# Patient Record
Sex: MALE | Race: WHITE | NOT HISPANIC OR LATINO | URBAN - METROPOLITAN AREA
[De-identification: names, ages, dates, MRNs, and addresses within clinical notes are randomized per-mention and may not be internally consistent; named-entity substitution may affect disease eponyms.]

---

## 2024-10-14 ENCOUNTER — OFFICE VISIT (OUTPATIENT)
Dept: URGENT CARE | Facility: CLINIC | Age: 34
End: 2024-10-14

## 2024-10-14 ENCOUNTER — HOSPITAL ENCOUNTER (INPATIENT)
Facility: HOSPITAL | Age: 34
LOS: 2 days | Discharge: HOME | DRG: 433 | End: 2024-10-17
Attending: EMERGENCY MEDICINE | Admitting: STUDENT IN AN ORGANIZED HEALTH CARE EDUCATION/TRAINING PROGRAM

## 2024-10-14 VITALS
WEIGHT: 200 LBS | DIASTOLIC BLOOD PRESSURE: 87 MMHG | SYSTOLIC BLOOD PRESSURE: 139 MMHG | HEART RATE: 131 BPM | BODY MASS INDEX: 27.09 KG/M2 | HEIGHT: 72 IN | RESPIRATION RATE: 19 BRPM | OXYGEN SATURATION: 99 % | TEMPERATURE: 98 F

## 2024-10-14 DIAGNOSIS — R17 JAUNDICE: ICD-10-CM

## 2024-10-14 DIAGNOSIS — R30.0 DYSURIA: Primary | ICD-10-CM

## 2024-10-14 DIAGNOSIS — K70.9 CHRONIC LIVER DISEASE DUE TO ALCOHOL: Primary | ICD-10-CM

## 2024-10-14 DIAGNOSIS — R21 RASH: ICD-10-CM

## 2024-10-14 DIAGNOSIS — R39.11 URINARY HESITANCY: ICD-10-CM

## 2024-10-14 DIAGNOSIS — R10.9 ABDOMINAL PAIN, UNSPECIFIED ABDOMINAL LOCATION: ICD-10-CM

## 2024-10-14 DIAGNOSIS — R00.0 TACHYCARDIA: ICD-10-CM

## 2024-10-14 DIAGNOSIS — R07.9 CHEST PAIN: ICD-10-CM

## 2024-10-14 DIAGNOSIS — R17 HIGH BILIRUBIN: ICD-10-CM

## 2024-10-14 LAB
ALLENS TEST: ABNORMAL
BASOPHILS # BLD AUTO: 0.06 K/UL (ref 0–0.2)
BASOPHILS NFR BLD: 0.5 % (ref 0–1.9)
BILIRUBIN, UA POC OHS: ABNORMAL
BLOOD, UA POC OHS: ABNORMAL
BUN SERPL-MCNC: 6 MG/DL (ref 6–30)
CHLORIDE SERPL-SCNC: 103 MMOL/L (ref 95–110)
CLARITY, UA POC OHS: CLEAR
COLOR, UA POC OHS: ABNORMAL
CREAT SERPL-MCNC: 0.9 MG/DL (ref 0.5–1.4)
DELSYS: ABNORMAL
DIFFERENTIAL METHOD BLD: ABNORMAL
EOSINOPHIL # BLD AUTO: 0 K/UL (ref 0–0.5)
EOSINOPHIL NFR BLD: 0.2 % (ref 0–8)
ERYTHROCYTE [DISTWIDTH] IN BLOOD BY AUTOMATED COUNT: 17.8 % (ref 11.5–14.5)
ETHANOL SERPL-MCNC: <10 MG/DL
FIO2: 21
GLUCOSE SERPL-MCNC: 114 MG/DL (ref 70–110)
GLUCOSE SERPL-MCNC: 92 MG/DL (ref 70–110)
GLUCOSE, UA POC OHS: 100
HCT VFR BLD AUTO: 37.3 % (ref 40–54)
HCT VFR BLD CALC: 41 %PCV (ref 36–54)
HCV AB SERPL QL IA: NORMAL
HGB BLD-MCNC: 13.7 G/DL (ref 14–18)
HIV 1+2 AB+HIV1 P24 AG SERPL QL IA: NORMAL
IMM GRANULOCYTES # BLD AUTO: 0.03 K/UL (ref 0–0.04)
IMM GRANULOCYTES NFR BLD AUTO: 0.3 % (ref 0–0.5)
INR PPP: 1.5 (ref 0.8–1.2)
KETONES, UA POC OHS: 15
LDH SERPL L TO P-CCNC: 2.46 MMOL/L (ref 0.5–2.2)
LEUKOCYTES, UA POC OHS: NEGATIVE
LYMPHOCYTES # BLD AUTO: 1.3 K/UL (ref 1–4.8)
LYMPHOCYTES NFR BLD: 11.4 % (ref 18–48)
MCH RBC QN AUTO: 36.3 PG (ref 27–31)
MCHC RBC AUTO-ENTMCNC: 36.7 G/DL (ref 32–36)
MCV RBC AUTO: 99 FL (ref 82–98)
MODE: ABNORMAL
MONOCYTES # BLD AUTO: 1.4 K/UL (ref 0.3–1)
MONOCYTES NFR BLD: 12.1 % (ref 4–15)
NEUTROPHILS # BLD AUTO: 8.8 K/UL (ref 1.8–7.7)
NEUTROPHILS NFR BLD: 75.5 % (ref 38–73)
NITRITE, UA POC OHS: NEGATIVE
NRBC BLD-RTO: 0 /100 WBC
PH, UA POC OHS: 6.5
PLATELET # BLD AUTO: 211 K/UL (ref 150–450)
PMV BLD AUTO: 11.5 FL (ref 9.2–12.9)
POC IONIZED CALCIUM: 0.89 MMOL/L (ref 1.06–1.42)
POC TCO2 (MEASURED): 24 MMOL/L (ref 23–29)
POTASSIUM BLD-SCNC: 8.1 MMOL/L (ref 3.5–5.1)
PROTEIN, UA POC OHS: 100
PROTHROMBIN TIME: 15.9 SEC (ref 9–12.5)
RBC # BLD AUTO: 3.77 M/UL (ref 4.6–6.2)
SAMPLE: ABNORMAL
SAMPLE: ABNORMAL
SITE: ABNORMAL
SODIUM BLD-SCNC: 132 MMOL/L (ref 136–145)
SPECIFIC GRAVITY, UA POC OHS: 1.02
UROBILINOGEN, UA POC OHS: 1
WBC # BLD AUTO: 11.7 K/UL (ref 3.9–12.7)

## 2024-10-14 PROCEDURE — 87389 HIV-1 AG W/HIV-1&-2 AB AG IA: CPT | Performed by: PHYSICIAN ASSISTANT

## 2024-10-14 PROCEDURE — 83605 ASSAY OF LACTIC ACID: CPT

## 2024-10-14 PROCEDURE — 25500020 PHARM REV CODE 255: Performed by: EMERGENCY MEDICINE

## 2024-10-14 PROCEDURE — 99285 EMERGENCY DEPT VISIT HI MDM: CPT | Mod: 25

## 2024-10-14 PROCEDURE — 86803 HEPATITIS C AB TEST: CPT | Performed by: PHYSICIAN ASSISTANT

## 2024-10-14 PROCEDURE — 81003 URINALYSIS AUTO W/O SCOPE: CPT | Mod: QW,S$GLB,,

## 2024-10-14 PROCEDURE — 96360 HYDRATION IV INFUSION INIT: CPT

## 2024-10-14 PROCEDURE — 80047 BASIC METABLC PNL IONIZED CA: CPT

## 2024-10-14 PROCEDURE — 63600175 PHARM REV CODE 636 W HCPCS: Performed by: EMERGENCY MEDICINE

## 2024-10-14 PROCEDURE — 82248 BILIRUBIN DIRECT: CPT

## 2024-10-14 PROCEDURE — 93010 ELECTROCARDIOGRAM REPORT: CPT | Mod: ,,, | Performed by: INTERNAL MEDICINE

## 2024-10-14 PROCEDURE — 80053 COMPREHEN METABOLIC PANEL: CPT

## 2024-10-14 PROCEDURE — 82962 GLUCOSE BLOOD TEST: CPT | Mod: ,,,

## 2024-10-14 PROCEDURE — 82077 ASSAY SPEC XCP UR&BREATH IA: CPT | Performed by: EMERGENCY MEDICINE

## 2024-10-14 PROCEDURE — 93005 ELECTROCARDIOGRAM TRACING: CPT

## 2024-10-14 PROCEDURE — 84100 ASSAY OF PHOSPHORUS: CPT

## 2024-10-14 PROCEDURE — 99900035 HC TECH TIME PER 15 MIN (STAT)

## 2024-10-14 PROCEDURE — 85025 COMPLETE CBC W/AUTO DIFF WBC: CPT | Performed by: EMERGENCY MEDICINE

## 2024-10-14 PROCEDURE — 85610 PROTHROMBIN TIME: CPT | Performed by: EMERGENCY MEDICINE

## 2024-10-14 PROCEDURE — 99204 OFFICE O/P NEW MOD 45 MIN: CPT | Mod: TIER,S$GLB,,

## 2024-10-14 PROCEDURE — 83735 ASSAY OF MAGNESIUM: CPT

## 2024-10-14 PROCEDURE — 96361 HYDRATE IV INFUSION ADD-ON: CPT

## 2024-10-14 RX ADMIN — IOHEXOL 100 ML: 350 INJECTION, SOLUTION INTRAVENOUS at 11:10

## 2024-10-14 RX ADMIN — SODIUM CHLORIDE, POTASSIUM CHLORIDE, SODIUM LACTATE AND CALCIUM CHLORIDE 1000 ML: 600; 310; 30; 20 INJECTION, SOLUTION INTRAVENOUS at 09:10

## 2024-10-14 NOTE — PROGRESS NOTES
Subjective:      Patient ID: Fermin Moreno is a 34 y.o. male.    Vitals:  height is 6' (1.829 m) and weight is 90.7 kg (200 lb). His oral temperature is 97.8 °F (36.6 °C). His blood pressure is 139/87 and his pulse is 131 (abnormal). His respiration is 19 and oxygen saturation is 99%.     Chief Complaint: Dysuria    This is a 34 y.o. male who presents today with a chief complaint of dark urine, burning with urination, hard to urinate, and abdominal pain x 1 week.     Upon assessment of pt, pt has generalized rash with obvious jaundice noted to skin. Pts has yellowing of bilateral eye whites. Pt denies any past medial history involving liver.         Dysuria   This is a new problem. The current episode started in the past 7 days. The problem occurs every urination. The problem has been gradually worsening. The quality of the pain is described as burning. The pain is at a severity of 0/10. The patient is experiencing no pain. Maximum temperature: unchecked. The fever has been present for 1 - 2 days. Associated symptoms include chills, frequency, hematuria, hesitancy, nausea, sweats, urgency and rash. Treatments tried: Azo and OTC nausea medication. The treatment provided no relief.       Constitution: Positive for chills, fatigue and generalized weakness.   HENT: Negative.     Neck: neck negative.   Cardiovascular: Negative.    Eyes: Negative.    Respiratory: Negative.     Gastrointestinal:  Positive for abdominal pain and nausea.   Endocrine: negative.   Genitourinary:  Positive for dysuria, frequency, urgency, urine decreased and hematuria.   Musculoskeletal: Negative.    Skin:  Positive for color change and rash.   Allergic/Immunologic: Negative.    Neurological: Negative.    Hematologic/Lymphatic: Negative.    Psychiatric/Behavioral: Negative.        Objective:     Physical Exam   Constitutional: He is oriented to person, place, and time. He appears toxic. He appears ill.   HENT:   Head: Normocephalic and  atraumatic.   Nose: Nose normal.   Eyes: Scleral icterus is present.      Comments: Pt has obvious yellowing discoloration noted to bilateral eyes   Neck: Neck supple.   Cardiovascular: Regular rhythm, normal heart sounds and normal pulses. Tachycardia present.   Pulmonary/Chest: Effort normal and breath sounds normal.   Abdominal: Bowel sounds are normal. He exhibits distension. There is abdominal tenderness.   Musculoskeletal: Normal range of motion.         General: Normal range of motion.   Neurological: no focal deficit. He is alert, oriented to person, place, and time and at baseline.   Skin: Skin is rash. jaundice  Psychiatric: His behavior is normal. Mood, judgment and thought content normal.   Nursing note and vitals reviewed.      Assessment:     1. Dysuria    2. Jaundice    3. Rash    4. Abdominal pain, unspecified abdominal location    5. Urinary hesitancy    6. High bilirubin      Results for orders placed or performed in visit on 10/14/24   POCT Urinalysis(Instrument)    Collection Time: 10/14/24  4:20 PM   Result Value Ref Range    Color, POC UA Jackie (A) Yellow, Straw, Colorless    Clarity, POC UA Clear Clear    Glucose, POC  (A) Negative    Bilirubin, POC UA Large (A) Negative    Ketones, POC UA 15 (A) Negative    Spec Grav POC UA 1.025 1.005 - 1.030    Blood, POC UA Trace-intact (A) Negative    pH, POC UA 6.5 5.0 - 8.0    Protein, POC  (A) Negative    Urobilinogen, POC UA 1.0 <=1.0    Nitrite, POC UA Negative Negative    WBC, POC UA Negative Negative   POCT Glucose, Hand-Held Device    Collection Time: 10/14/24  4:43 PM   Result Value Ref Range    POC Glucose 114 (A) 70 - 110 MG/DL      Plan:       Dysuria  -     POCT Urinalysis(Instrument)  -     POCT Glucose, Hand-Held Device    Jaundice    Rash    Abdominal pain, unspecified abdominal location    Urinary hesitancy    High bilirubin          Medical Decision Making:   Initial Assessment:   his is a 34 y.o. male who presents today with  a chief complaint of dark urine, burning with urination, hard to urinate, and abdominal pain x 1 week.       Differential Diagnosis:   Liver disease  Hepatitis  Cirrhosis  Biliary tract disease   Gallstones  Cholecystitis   Cholangitis  Pancreatitis  Urgent Care Management:  Offered to call ambulance for patient, patient declined stating he has family member with him and he would rather have them drive.   Made numerous attempts to call report first attempt stayed on hold for 26 minutes then disconnected, second time stayed on hold 11 minutes then again disconnected, third attempt stayed on hold for 43 minutes.

## 2024-10-14 NOTE — FIRST PROVIDER EVALUATION
Medical screening examination initiated.  I have conducted a focused provider triage encounter, findings are as follows:    Brief history of present illness:  33 y/o hx alcohol overuse presents from  for evaluation of elevated bilirubin and jaundice    Vitals:    10/14/24 1815   BP: (!) 166/101   Pulse: (!) 133   Resp: 20   Temp: 98.6 °F (37 °C)   TempSrc: Oral   SpO2: 100%   Weight: 104.3 kg (230 lb)   Height: 6' (1.829 m)       Pertinent physical exam:  mildly anxious, ambulatory unassisted    Brief workup plan:  labs    Preliminary workup initiated; this workup will be continued and followed by the physician or advanced practice provider that is assigned to the patient when roomed.

## 2024-10-15 PROBLEM — K74.60 DECOMPENSATED CIRRHOSIS: Status: ACTIVE | Noted: 2024-10-15

## 2024-10-15 PROBLEM — K70.9: Status: ACTIVE | Noted: 2024-10-15

## 2024-10-15 PROBLEM — R00.0 TACHYCARDIA: Status: ACTIVE | Noted: 2024-10-15

## 2024-10-15 PROBLEM — D68.9 COAGULOPATHY: Status: ACTIVE | Noted: 2024-10-15

## 2024-10-15 PROBLEM — K70.31 ASCITES DUE TO ALCOHOLIC CIRRHOSIS: Status: ACTIVE | Noted: 2024-10-15

## 2024-10-15 PROBLEM — K72.90 DECOMPENSATED CIRRHOSIS: Status: ACTIVE | Noted: 2024-10-15

## 2024-10-15 LAB
AFP SERPL-MCNC: 4 NG/ML (ref 0–8.4)
ALBUMIN SERPL BCP-MCNC: 2.5 G/DL (ref 3.5–5.2)
ALBUMIN SERPL BCP-MCNC: 2.8 G/DL (ref 3.5–5.2)
ALP SERPL-CCNC: 200 U/L (ref 55–135)
ALP SERPL-CCNC: 240 U/L (ref 55–135)
ALT SERPL W/O P-5'-P-CCNC: 23 U/L (ref 10–44)
ALT SERPL W/O P-5'-P-CCNC: 27 U/L (ref 10–44)
AMMONIA PLAS-SCNC: 104 UMOL/L (ref 10–50)
ANION GAP SERPL CALC-SCNC: 13 MMOL/L (ref 8–16)
ANION GAP SERPL CALC-SCNC: 14 MMOL/L (ref 8–16)
APAP SERPL-MCNC: <3 UG/ML (ref 10–20)
AST SERPL-CCNC: 120 U/L (ref 10–40)
AST SERPL-CCNC: 131 U/L (ref 10–40)
BACTERIA #/AREA URNS AUTO: NORMAL /HPF
BASOPHILS # BLD AUTO: 0.05 K/UL (ref 0–0.2)
BASOPHILS NFR BLD: 0.5 % (ref 0–1.9)
BILIRUB DIRECT SERPL-MCNC: 11.3 MG/DL (ref 0.1–0.3)
BILIRUB SERPL-MCNC: 14.8 MG/DL (ref 0.1–1)
BILIRUB SERPL-MCNC: 15.8 MG/DL (ref 0.1–1)
BILIRUB UR QL STRIP: ABNORMAL
BUN SERPL-MCNC: 6 MG/DL (ref 6–20)
BUN SERPL-MCNC: 6 MG/DL (ref 6–20)
CALCIUM SERPL-MCNC: 8.8 MG/DL (ref 8.7–10.5)
CALCIUM SERPL-MCNC: 9 MG/DL (ref 8.7–10.5)
CERULOPLASMIN SERPL-MCNC: 30 MG/DL (ref 15–45)
CHLORIDE SERPL-SCNC: 102 MMOL/L (ref 95–110)
CHLORIDE SERPL-SCNC: 102 MMOL/L (ref 95–110)
CLARITY UR REFRACT.AUTO: ABNORMAL
CMV DNA SPEC QL NAA+PROBE: NORMAL
CO2 SERPL-SCNC: 22 MMOL/L (ref 23–29)
CO2 SERPL-SCNC: 23 MMOL/L (ref 23–29)
COLOR UR AUTO: ABNORMAL
CREAT SERPL-MCNC: 0.7 MG/DL (ref 0.5–1.4)
CREAT SERPL-MCNC: 0.8 MG/DL (ref 0.5–1.4)
CYTOMEGALOVIRUS PCR, QUANT: NOT DETECTED IU/ML
DIFFERENTIAL METHOD BLD: ABNORMAL
EOSINOPHIL # BLD AUTO: 0.1 K/UL (ref 0–0.5)
EOSINOPHIL NFR BLD: 0.5 % (ref 0–8)
ERYTHROCYTE [DISTWIDTH] IN BLOOD BY AUTOMATED COUNT: 17.4 % (ref 11.5–14.5)
EST. GFR  (NO RACE VARIABLE): >60 ML/MIN/1.73 M^2
EST. GFR  (NO RACE VARIABLE): >60 ML/MIN/1.73 M^2
FOLATE SERPL-MCNC: 3.1 NG/ML (ref 4–24)
GGT SERPL-CCNC: 1846 U/L (ref 8–55)
GLUCOSE SERPL-MCNC: 85 MG/DL (ref 70–110)
GLUCOSE SERPL-MCNC: 95 MG/DL (ref 70–110)
GLUCOSE UR QL STRIP: NEGATIVE
HAV IGM SERPL QL IA: NORMAL
HBV CORE IGM SERPL QL IA: NORMAL
HBV SURFACE AG SERPL QL IA: NORMAL
HCT VFR BLD AUTO: 30.1 % (ref 40–54)
HCV AB SERPL QL IA: NORMAL
HGB BLD-MCNC: 11.1 G/DL (ref 14–18)
HGB UR QL STRIP: ABNORMAL
HYALINE CASTS UR QL AUTO: 0 /LPF
IMM GRANULOCYTES # BLD AUTO: 0.04 K/UL (ref 0–0.04)
IMM GRANULOCYTES NFR BLD AUTO: 0.4 % (ref 0–0.5)
INR PPP: 1.6 (ref 0.8–1.2)
KETONES UR QL STRIP: ABNORMAL
LEUKOCYTE ESTERASE UR QL STRIP: NEGATIVE
LYMPHOCYTES # BLD AUTO: 1.4 K/UL (ref 1–4.8)
LYMPHOCYTES NFR BLD: 13.7 % (ref 18–48)
MAGNESIUM SERPL-MCNC: 1.7 MG/DL (ref 1.6–2.6)
MAGNESIUM SERPL-MCNC: 1.8 MG/DL (ref 1.6–2.6)
MCH RBC QN AUTO: 35.9 PG (ref 27–31)
MCHC RBC AUTO-ENTMCNC: 36.9 G/DL (ref 32–36)
MCV RBC AUTO: 97 FL (ref 82–98)
MICROSCOPIC COMMENT: NORMAL
MONOCYTES # BLD AUTO: 1.3 K/UL (ref 0.3–1)
MONOCYTES NFR BLD: 12.6 % (ref 4–15)
NEUTROPHILS # BLD AUTO: 7.3 K/UL (ref 1.8–7.7)
NEUTROPHILS NFR BLD: 72.3 % (ref 38–73)
NITRITE UR QL STRIP: NEGATIVE
NRBC BLD-RTO: 0 /100 WBC
OHS QRS DURATION: 88 MS
OHS QTC CALCULATION: 473 MS
PH UR STRIP: 6 [PH] (ref 5–8)
PHOSPHATE SERPL-MCNC: 3.3 MG/DL (ref 2.7–4.5)
PHOSPHATE SERPL-MCNC: 3.9 MG/DL (ref 2.7–4.5)
PLATELET # BLD AUTO: 153 K/UL (ref 150–450)
PMV BLD AUTO: 11 FL (ref 9.2–12.9)
POCT GLUCOSE: 104 MG/DL (ref 70–110)
POCT GLUCOSE: 113 MG/DL (ref 70–110)
POTASSIUM SERPL-SCNC: 3.7 MMOL/L (ref 3.5–5.1)
POTASSIUM SERPL-SCNC: 4 MMOL/L (ref 3.5–5.1)
PROT SERPL-MCNC: 7 G/DL (ref 6–8.4)
PROT SERPL-MCNC: 8.1 G/DL (ref 6–8.4)
PROT UR QL STRIP: ABNORMAL
PROTHROMBIN TIME: 16.9 SEC (ref 9–12.5)
RBC # BLD AUTO: 3.09 M/UL (ref 4.6–6.2)
RBC #/AREA URNS AUTO: 4 /HPF (ref 0–4)
SODIUM SERPL-SCNC: 137 MMOL/L (ref 136–145)
SODIUM SERPL-SCNC: 139 MMOL/L (ref 136–145)
SP GR UR STRIP: >1.03 (ref 1–1.03)
SQUAMOUS #/AREA URNS AUTO: 2 /HPF
URN SPEC COLLECT METH UR: ABNORMAL
WBC # BLD AUTO: 10.03 K/UL (ref 3.9–12.7)
WBC #/AREA URNS AUTO: 2 /HPF (ref 0–5)

## 2024-10-15 PROCEDURE — 82103 ALPHA-1-ANTITRYPSIN TOTAL: CPT

## 2024-10-15 PROCEDURE — 82104 ALPHA-1-ANTITRYPSIN PHENO: CPT

## 2024-10-15 PROCEDURE — 20600001 HC STEP DOWN PRIVATE ROOM

## 2024-10-15 PROCEDURE — 80143 DRUG ASSAY ACETAMINOPHEN: CPT

## 2024-10-15 PROCEDURE — 82787 IGG 1 2 3 OR 4 EACH: CPT

## 2024-10-15 PROCEDURE — 82977 ASSAY OF GGT: CPT

## 2024-10-15 PROCEDURE — 82140 ASSAY OF AMMONIA: CPT

## 2024-10-15 PROCEDURE — 25000003 PHARM REV CODE 250: Performed by: STUDENT IN AN ORGANIZED HEALTH CARE EDUCATION/TRAINING PROGRAM

## 2024-10-15 PROCEDURE — 86663 EPSTEIN-BARR ANTIBODY: CPT

## 2024-10-15 PROCEDURE — 80074 ACUTE HEPATITIS PANEL: CPT

## 2024-10-15 PROCEDURE — 99222 1ST HOSP IP/OBS MODERATE 55: CPT | Mod: ,,, | Performed by: PSYCHIATRY & NEUROLOGY

## 2024-10-15 PROCEDURE — 25000003 PHARM REV CODE 250

## 2024-10-15 PROCEDURE — 84100 ASSAY OF PHOSPHORUS: CPT

## 2024-10-15 PROCEDURE — 99223 1ST HOSP IP/OBS HIGH 75: CPT | Mod: ,,, | Performed by: INTERNAL MEDICINE

## 2024-10-15 PROCEDURE — 86038 ANTINUCLEAR ANTIBODIES: CPT

## 2024-10-15 PROCEDURE — 80321 ALCOHOLS BIOMARKERS 1OR 2: CPT | Performed by: STUDENT IN AN ORGANIZED HEALTH CARE EDUCATION/TRAINING PROGRAM

## 2024-10-15 PROCEDURE — 87040 BLOOD CULTURE FOR BACTERIA: CPT | Mod: 59

## 2024-10-15 PROCEDURE — 82105 ALPHA-FETOPROTEIN SERUM: CPT

## 2024-10-15 PROCEDURE — 85610 PROTHROMBIN TIME: CPT

## 2024-10-15 PROCEDURE — 87529 HSV DNA AMP PROBE: CPT | Mod: 59

## 2024-10-15 PROCEDURE — 80053 COMPREHEN METABOLIC PANEL: CPT

## 2024-10-15 PROCEDURE — 83735 ASSAY OF MAGNESIUM: CPT

## 2024-10-15 PROCEDURE — 82746 ASSAY OF FOLIC ACID SERUM: CPT

## 2024-10-15 PROCEDURE — 86381 MITOCHONDRIAL ANTIBODY EACH: CPT

## 2024-10-15 PROCEDURE — 63600175 PHARM REV CODE 636 W HCPCS: Performed by: STUDENT IN AN ORGANIZED HEALTH CARE EDUCATION/TRAINING PROGRAM

## 2024-10-15 PROCEDURE — 85025 COMPLETE CBC W/AUTO DIFF WBC: CPT

## 2024-10-15 PROCEDURE — 86015 ACTIN ANTIBODY EACH: CPT

## 2024-10-15 PROCEDURE — 81001 URINALYSIS AUTO W/SCOPE: CPT | Performed by: EMERGENCY MEDICINE

## 2024-10-15 PROCEDURE — 82390 ASSAY OF CERULOPLASMIN: CPT

## 2024-10-15 PROCEDURE — 94761 N-INVAS EAR/PLS OXIMETRY MLT: CPT

## 2024-10-15 RX ORDER — IBUPROFEN 200 MG
16 TABLET ORAL
Status: DISCONTINUED | OUTPATIENT
Start: 2024-10-15 | End: 2024-10-17 | Stop reason: HOSPADM

## 2024-10-15 RX ORDER — TALC
9 POWDER (GRAM) TOPICAL NIGHTLY PRN
Status: DISCONTINUED | OUTPATIENT
Start: 2024-10-15 | End: 2024-10-17 | Stop reason: HOSPADM

## 2024-10-15 RX ORDER — PROCHLORPERAZINE EDISYLATE 5 MG/ML
5 INJECTION INTRAMUSCULAR; INTRAVENOUS EVERY 6 HOURS PRN
Status: DISCONTINUED | OUTPATIENT
Start: 2024-10-15 | End: 2024-10-17 | Stop reason: HOSPADM

## 2024-10-15 RX ORDER — ACETAMINOPHEN 325 MG/1
650 TABLET ORAL EVERY 6 HOURS PRN
Status: DISCONTINUED | OUTPATIENT
Start: 2024-10-15 | End: 2024-10-17 | Stop reason: HOSPADM

## 2024-10-15 RX ORDER — ALUMINUM HYDROXIDE, MAGNESIUM HYDROXIDE, AND SIMETHICONE 1200; 120; 1200 MG/30ML; MG/30ML; MG/30ML
30 SUSPENSION ORAL 4 TIMES DAILY PRN
Status: DISCONTINUED | OUTPATIENT
Start: 2024-10-15 | End: 2024-10-17 | Stop reason: HOSPADM

## 2024-10-15 RX ORDER — THIAMINE HCL 100 MG
100 TABLET ORAL DAILY
Status: DISCONTINUED | OUTPATIENT
Start: 2024-10-15 | End: 2024-10-17 | Stop reason: HOSPADM

## 2024-10-15 RX ORDER — IBUPROFEN 200 MG
1 TABLET ORAL DAILY PRN
Status: DISCONTINUED | OUTPATIENT
Start: 2024-10-15 | End: 2024-10-17 | Stop reason: HOSPADM

## 2024-10-15 RX ORDER — DIAZEPAM 5 MG/1
10 TABLET ORAL EVERY 6 HOURS PRN
Status: DISCONTINUED | OUTPATIENT
Start: 2024-10-15 | End: 2024-10-17 | Stop reason: HOSPADM

## 2024-10-15 RX ORDER — GLUCAGON 1 MG
1 KIT INJECTION
Status: DISCONTINUED | OUTPATIENT
Start: 2024-10-15 | End: 2024-10-17 | Stop reason: HOSPADM

## 2024-10-15 RX ORDER — NALOXONE HCL 0.4 MG/ML
0.02 VIAL (ML) INJECTION
Status: DISCONTINUED | OUTPATIENT
Start: 2024-10-15 | End: 2024-10-17 | Stop reason: HOSPADM

## 2024-10-15 RX ORDER — LACTULOSE 10 G/15ML
15 SOLUTION ORAL 2 TIMES DAILY
Status: DISCONTINUED | OUTPATIENT
Start: 2024-10-15 | End: 2024-10-16

## 2024-10-15 RX ORDER — IPRATROPIUM BROMIDE AND ALBUTEROL SULFATE 2.5; .5 MG/3ML; MG/3ML
3 SOLUTION RESPIRATORY (INHALATION) EVERY 4 HOURS PRN
Status: DISCONTINUED | OUTPATIENT
Start: 2024-10-15 | End: 2024-10-17 | Stop reason: HOSPADM

## 2024-10-15 RX ORDER — ONDANSETRON 8 MG/1
8 TABLET, ORALLY DISINTEGRATING ORAL EVERY 8 HOURS PRN
Status: DISCONTINUED | OUTPATIENT
Start: 2024-10-15 | End: 2024-10-17 | Stop reason: HOSPADM

## 2024-10-15 RX ORDER — IBUPROFEN 200 MG
24 TABLET ORAL
Status: DISCONTINUED | OUTPATIENT
Start: 2024-10-15 | End: 2024-10-17 | Stop reason: HOSPADM

## 2024-10-15 RX ADMIN — Medication 100 MG: at 08:10

## 2024-10-15 RX ADMIN — LACTULOSE 15 G: 20 SOLUTION ORAL at 11:10

## 2024-10-15 RX ADMIN — CEFTRIAXONE SODIUM 1 G: 1 INJECTION, POWDER, FOR SOLUTION INTRAMUSCULAR; INTRAVENOUS at 12:10

## 2024-10-15 RX ADMIN — ALUMINUM HYDROXIDE, MAGNESIUM HYDROXIDE, AND SIMETHICONE 30 ML: 200; 200; 20 SUSPENSION ORAL at 08:10

## 2024-10-15 RX ADMIN — THERA TABS 1 TABLET: TAB at 08:10

## 2024-10-15 RX ADMIN — LACTULOSE 15 G: 20 SOLUTION ORAL at 08:10

## 2024-10-15 NOTE — CONSULTS
[facility]   DEPARTMENT OF PSYCHIATRY     IDENTIFIERS & DEMOGRAPHICS:     SERVICE: Addiction  ENCOUNTER: initial    -- PATIENT IDENTIFIERS: Fermin Moreno  [id number]  1990  34 y.o.  male  -- ENCOUNTER PROVIDER: William Sistrunk, MD        PRESENTATION:     OVERVIEW OF THE HPI:    35 yo M with past psychiatric history of alcohol use disorder and ADD with new diagnosis of liver cirrhosis 2/2 alcohol abuse who presents for decompensated cirrhosis. Consulted for alcohol abuse education.        SUBJECTIVE/CURRENT FINDINGS:    Chart reviewed and patient seen at the bedside with his spouse. He was unaware the consult to addiction psychiatry was placed but was amenable to conversation. Patient describes consistent drinking for the past 8 years, currently drinking about 12 beers daily. His last drink was 5 days ago ~10/10/2024. He denies any complicated withdrawals or history of seizure. He originally presented to urgent care with c/o dark urine, dysuria, jaundice, chills, sweats, tremors, anxiety, and nausea. He has intermittent bouts of anxiety but does not take any psychiatric medications. Upon discussion regarding treatment options such as inpatient rehab or intensive outpatient programs, patient expressed neutral opinion. He expressed feeling overwhelmed and not being in a place to make a decision. He was counseled on medication assisted treatment options which would be best managed by an outpatient psychiatrist. He does not currently have an outpatient psychiatrist but is open to seeing one. Patient was educated on complete abstinence of alcohol moving forwards due to his liver cirrhosis and to prevent the need for a transplant in the future. Patient expressed understanding and was in agreement. Overall patient was processing the information and new diagnosis but was accepting and thankful for the resources provided. He was advised to ask his primary team if any questions arose regarding addiction psychiatry  resources.     Per Chart Review:    Per Primary Team:   Fermin Moreno is a 34 male PMH of Psoriasis, alcohol abuse being admitted to  for hyperbilirubinemia and jaundice. Patient initially presented to ED with CC dark urine and mild dysuria x 1.5 weeks. Associated chills, sweats, tremors, anxiety, nausea, and yellowing of this skin/ eyes. Patient endorses about 8 years of heavy drinking. First, 3-4 years drinking ~1 pint vodka daily followed by 4 years of drinking ~12 beers daily. Last drink was 5 days ago. Denies documented fevers, confusion, hallucinations, LOC, seizures, lightheadedness, lethargy, abdominal swelling, leg swelling, SOB, chest pain, bowel changes, vomiting, flank pain, bleeding. Endorses mild intermittent LLQ pain, resolved at time of admission. Patient denies known hx of liver disease, heart disease, kidney disease. He does not follow with a PCP and has never seen a hepatologist.       REVIEW OF SYSTEMS:     N   Sleep Disturbance/Disruption   N   Appetite/Weight Change   N   Alterations in Energy Level   N   Impaired Focus/Concentration   Y   Depressive Symptomatology  +depressed mood     Y   Excessive Anxiety/Worry  +generalized anxiety/worry     N   Dysregulated Mood/Behavior   N   Manic Symptomatology   N   Psychosis   N   Trauma-Related   N   Impulsivity/Compulsivity/Obsessionality    Regarding the current presentation, no other significant issues or complaints are voiced or known at this time.       ADD-ON PSYCHOTHERAPY:     ADD-ON THERAPY     HISTORY:       PSYCH  SUBSTANCE  FAMILY  SOCIAL  MEDICAL     Y   Previous/Pre-Existing Psychiatric Diagnoses  anxiety, depression   N   Past Psychotropic Trials   N   Current Psychiatric Provider   N   Hx of Outpatient Psychiatric Treatment   N   Hx of Psychiatric Hospitalization   N   Hx of Suicidal Ideation/Threats   N   Hx of Suicide Attempts/Gestures   N   Hx of  Homicidal Ideation/Threats   N   Hx of Homicidal Behavior   N   Hx of Non-Suicidal Self-Injurious Behavior   N   Hx of Perpetrated Violence   N   Documented Hx of Malingering   N   Hx of Psychosis   N   Hx of Bipolar Diathesis   Y   Hx of Depression   Y   Hx of Anxiety   N   Hx of Insomnia   N   Hx of Delirium     N   Hx of Formal HENRRY Treatment   Y   Recent Alcohol Consumption  +SASQ   +   Drinking Pattern (frequency)  +daily   +   Drinking Pattern (amount)  +heavy drinking   N   Hx of Nicotine Use   Y   Hx of Alcohol Misuse/Abuse   N   Hx of Illicit Drug Misuse/Abuse   N   Hx of Prescription Drug Misuse/Abuse     U   Family Psychiatric History     U   Developmental Delay/Disability   Y   GED/High School Diploma   U   Post-Secondary Education   U   Currently Employed   U   Currently on Disability   U   Financially Stable   Y   Functions Independently   Y   Domiciled   Y   Intact Support System   N   Heterosexual/Cisgender   Y   Currently in a Relationship   Y   Ever    U   Ever /   U   Children/Dependents   U   Yazdanism/Spiritual   U   Hobbies/Recreational Activities   U   Hx of  Service     U   Ever Charged/Convicted   U   Current Probation/[place]/Diversion   U   Hx of Incarceration     N   Hx of Seizures   N   Hx of Head Trauma     Y   Medical Hx & Diagnoses  decompasated liver cirrosis due to Alcohol use.   N   Allergies    >> SCHEDULED AND PRN MEDS: reviewed/reconciled  see MEDCARD      Allergies:  Patient has no known allergies.     EXAMINATION:     VITALS:  /82   Pulse (!) 111   Temp 99.1 °F (37.3 °C) (Oral)   Resp 18   Ht 6' (1.829 m)   Wt 97.8 kg (215 lb 9.8 oz)   SpO2 97%   BMI 29.24 kg/m²     MENTAL STATUS EXAMINATION:  Appearance: appears stated age, normal weight  appropriately dressed,  adequately groomed, in no apparent distress, well-appearing    slightly anxious  Behavior & Attitude: participative, under good behavioral control, able to redirect, appropriate eye contact  calm, engaged, agreeable, cooperative    Movements & Motor Activity: no psychomotor agitation, no psychomotor retardation, normal gait, normal station, ambulates without assistance, not wheelchair bound (able to ambulate), no weakness, no spasticity, no rigidity, no tics, no tremor, no akathisia, no dyskinesia, no ataxia, no parkinsonism    Speech & Language: normal rate, normal volume, normal quantity, normal latency, spontaneous, reciprocal, fluent    Mood: fine/good  Affect: euthymic, reactive, full range  appropriate given the situation/context, mood congruent    Thought Process & Associations: linear, goal-directed, organized, logical, coherent, relevant, abstract  no loosening of associations    Thought Content & Perceptions: no delusions, no paranoid ideation, no ideas of reference, no grandiosity, no hyperreligiosity, no hallucinations, no responding to internal stimuli    Sensorium: awake, alert, clear  no confusion, no delirium    Orientation: grossly intact, oriented to person, oriented to place, oriented to time, oriented to situation    Recent & Remote Memory: intact (recent), intact (remote)    Attention & Concentration: intact  attentive to conversation, not easily distracted    Fund of Knowledge: intact, vocabulary proficient    Insight: intact, good  demonstrates sufficient awareness of condition/situation    Judgment: intact, good  heeds instructions/advice            RISK & REGULATORY:      RISK PARAMETERS (current to the encounter/episode  NOT inclusive of past history):     N   Suicidal Ideation/Threats   N   Suicide Attempts/Gestures   N   Homicidal Ideation/Threats   N   Homicidal Behavior   N   Non-Suicidal Self-Injurious Behavior   N   Perpetrated  Violence     FIREARMS & WEAPONS:     N   Ready Access to Firearms   Y   Gun Safety Counseled  e.g., proper storage, inherent risk     SAFETY SCREENINGS:    -- FUTURE ORIENTED: YES  -- REMORSE/REGRET: YES    -- RISK MITIGATION & PREVENTION:      - INTERVENTIONS: safety plan, advice/counseling, harm reduction     REGULATORY:      INFORMED CONSENT & SHARED DECISION MAKING are the hallmark and bedrock of good clinical care, and as such have been employed and obtained, respectively, to the degree possible.  Discussed, to the extent possible, diagnosis, risks and benefits of proposed treatment (e.g., medication, therapy) vs alternative treatments vs no treatment, potential side effects of these treatments, and the inherent unpredictability of treatment.        WARNINGS & PRECAUTIONS:  >> In cases of emergencies (e.g. SI/HI resulting in danger to self or others, functioning deteriorating to the level of grave disability), call 911 or [phone], or present to the emergency department for immediate assistance.    >> Individuals should not operate a motor vehicle or heavy machinery if effects of medications or underlying symptoms/condition impair the ability to do so safely.    >> FULLY comply with ANY/ALL medication as prescribed/instructed and report ANY/ALL suspected adverse effects to appropriate health care providers.       ASSESSMENT & PLAN:     DIAGNOSES & PROBLEMS:       1.  Alcohol use disorder, severe, in detox period    PSYCHOTROPIC REGIMEN:   (C)=Continue as prescribed  (A)=Adjust as noted  (I)=Iniitate  (D)=Discontinue      1.  Agree with withdrawal mangamant. Continue PRN valium (C)    Alcohol use disorder, severe     >> DEFER management of NON-PSYCHIATRIC medication(s) to the prescribing primary and/or specialist provider(s)    -- PLAN (goals  recommentations):          >> DEFER management of NON-PSYCHIATRIC medication(s) to the prescribing primary and/or specialist provider(s)   >> attended to therapeutic  alliance, via the building and maintenance of rapport and trust  >> psychotherapy was provided during the session, and augmentation with additional psychotherapy is recommended or planned  >> follow with primary care provider for routine health maintenance and management of medical co-morbidities, as well as any indicated/needed specialists  >> advised to abstain from alcohol and illicit drug use  >> successfully complete a licensed accredited addiction rehab program and follow all aftercare recommendations  >> continue alcohol (or sedative-hypnotic) withdrawal protocol  >> will sign off at this time, thank you    ADDICTION COUNSELING & MANAGEMENT    >> counseled on full abstinence from alcohol and substances of abuse (illicit and prescription), as well as maintenance of a recovery lifestyle  >> harm reduction techniques discussed, as warranted, to mitigate risk from problematic behaviors  >> serial laboratory testing (e.g. PETH, serum ethanol, urine toxicology) recommended and/or planned to provide accountability, as well as guide and refine treatment moving forward  >> importance of lifelong, active engagement in 12 step (or equivalent) mutual self-help program(s) was stressed/reinforced, including regular meeting attendance and acquisition/maintenance of a sponsor  >> education and/or resources discussed and/or provided for various addiction recovery and rehabilitation options  >> motivational interviewing applied, relapse prevention provided    CHART REVIEW: available documentation has been reviewed, and pertinent elements of the chart have been incorporated into this evaluation where appropriate.       DIAGNOSTIC TESTING:      Glu 95  10/15/2024  Li *   *  TSH *   *    HgA1c *   *  VPA *   *   FT4 *   *    Na 137  10/15/2024  CLZ *   *  WBC 10.03  10/15/2024    Cr 0.7  10/15/2024  ANC 7.3; 72.3;   10/15/2024   Hgb 11.1 (L)  10/15/2024     BUN 6  10/15/2024  Trop I *   *  HCT 30.1 (L)  10/15/2024      GFR >60.0  10/15/2024   CPK *   *    10/15/2024     Alb 2.5 (L)  10/15/2024   PRL *   *  B12 *   *     T Bili 14.8 (H)  10/15/2024  Chol *   *  B9 3.1 (L)  10/15/2024     (H)  10/15/2024  TGs *   *  B1 *   *     (H)  10/15/2024  HDL *   *  Vit D *   *     ALT 23  10/15/2024  LDL *   *  HIV Non-reactive  10/14/2024     INR 1.6 (H)  10/15/2024  Lois *   *   Hep C Non-reactive  10/15/2024    GGT 1,846 (H)  10/15/2024  Lip *   *  RPR *   *    MCV 97  10/15/2024   NH4 104 (H)  10/15/2024  UPT *   *      PETH *   *  THC *   *    ETOH <10  10/14/2024  MYLES *   *    EtG *   *  AMP *   *    ALC *   *  OPI *   *    BZO *   *  MTD *   *     BAR *   *  BUP *   *    PCP *   *  FEN *   *     Results for orders placed or performed during the hospital encounter of 10/14/24   EKG 12-lead    Collection Time: 10/14/24  6:20 PM   Result Value Ref Range    QRS Duration 88 ms    [facility] QTC Calculation 473 ms    Narrative    Test Reason : R00.0,    Vent. Rate : 127 BPM     Atrial Rate : 127 BPM     P-R Int : 128 ms          QRS Dur : 088 ms      QT Int : 326 ms       P-R-T Axes : 041 -09 032 degrees     QTc Int : 473 ms    Sinus tachycardia  Minimal voltage criteria for LVH, may be normal variant ( R in aVL )  Borderline Abnormal ECG  No previous ECGs available  Confirmed by Ambrocio Montalvo MD ([id number]) on 10/15/2024 8:39:56 AM    Referred By: [email or web address]   SELF           Confirmed By:Ambrocio Montalvo MD        FRASER & LINKS:        Y  = yes/endorses     N  = no/denies     U  = unknown/unable to assess    ADHD   AIMS   AUDIT   AUDIT-C   C-SSRS (Screen)   C-SSRS (Short)   C-SSRS (Full)   DAST   DAST-10   LIA-7   MoCA   PCL-5   PHQ-9   HENRRY   YMRS     Inpatient consult to Psychiatry  Consult performed by: Sistrunk, William, MD  Consult ordered by: Alize Doyle PA-C        [facility] [employer]*

## 2024-10-15 NOTE — CONSULTS
[facility]  Hepatology  Consult Note    Patient Name: Fermin Moreno  MRN: [id number]  Admission Date: 10/14/2024  Hospital Length of Stay: 0 days  Code Status: Full Code   Attending Provider: Ena Stewart MD   Consulting Provider: Varun Mathias MD  Primary Care Physician: Beverly Jimenez III, MD  Principal Problem:Decompensated cirrhosis    Inpatient consult to Hepatology  Consult performed by: Varun Mathias MD  Consult ordered by: Alize Doyle PA-C      Subjective:     HPI: Fermin Moreno is a 34 y.o. male with history of alcohol use disorder, psoriasis, and ADHD who is admitted for decompensated alcohol-related cirrhosis. Patient initially presented on 10/14 to Urgent Care due to 2 weeks of dark urine and difficulty with urination. He was found to be jaundiced with scleral icterus and was directed to present to [facility] ED. In the ED, labs were notable for Hgb 13.7, PT 15.9, INR 1.5, , T bili 15.8 (direct bili 11.3),  and ALT 27. Albumin 2.8. CT showed changes consistent with chronic liver disease with diffuse heterogeneous attenuation of the liver parenchyma suggestive of underlying liver dysfunction. Associated stigmata of portal hypertension including splenomegaly, upper abdominal and gastroesophageal varices, and small volume ascites. Hepatology consulted for decompensated cirrhosis related to alcohol use.    Alcohol history: Started drinking heavily about 7 years ago. The first 3 years he reports drinking primarily hard liquor and about 1/2 pint of vodka regularly. The last four years he reports drinking up to 12 beers daily, but more commonly 6-8 beers; not daily but fairly regularly. Prior to 7 years ago, was not drinking regularly.    No history of autoimmune disease, prior liver disease, no IVDU or recent tattoos (two tattoos in distant past) and reports prior DUI when he was < 21 years old that was expunged.      Past Medical History:   Diagnosis Date    ADD (attention deficit  disorder)     Psoriasis        History reviewed. No pertinent surgical history.    No family history on file.    Social History     Socioeconomic History    Marital status: Single   Tobacco Use    Smoking status: Every Day     Types: Vaping with nicotine     Passive exposure: Current    Smokeless tobacco: Never   Substance and Sexual Activity    Alcohol use: Yes     Comment: Occ    Drug use: Never     Social Drivers of Health     Financial Resource Strain: Low Risk  (10/15/2024)    Overall Financial Resource Strain (CARDIA)     Difficulty of Paying Living Expenses: Not very hard   Food Insecurity: No Food Insecurity (10/15/2024)    Hunger Vital Sign     Worried About Running Out of Food in the Last Year: Never true     Ran Out of Food in the Last Year: Never true   Transportation Needs: No Transportation Needs (10/15/2024)    TRANSPORTATION NEEDS     Transportation : No   Stress: No Stress Concern Present (10/15/2024)    [employer] [place] [employer] - Occupational Stress Questionnaire     Feeling of Stress : Not at all   Housing Stability: Low Risk  (10/15/2024)    Housing Stability Vital Sign     Unable to Pay for Housing in the Last Year: No     Homeless in the Last Year: No       No current facility-administered medications on file prior to encounter.     Current Outpatient Medications on File Prior to Encounter   Medication Sig Dispense Refill    dextroamphetamine-amphetamine (AMPHETAMINE SALT COMBO) 20 mg tablet Take 1 tablet (20 mg total) by mouth 2 (two) times daily as needed. (Patient not taking: Reported on 10/14/2024) 60 tablet 0       Review of patient's allergies indicates:  No Known Allergies    Review of Systems   Constitutional:  Negative for chills and fever.   HENT:  Negative for congestion and sore throat.    Eyes:  Negative for double vision and photophobia.   Respiratory:  Negative for cough and shortness of breath.    Cardiovascular:  Negative for chest pain and palpitations.    Gastrointestinal:  Negative for abdominal pain and vomiting.   Genitourinary:  Negative for dysuria and urgency.   Musculoskeletal:  Negative for myalgias and neck pain.   Skin:  Positive for rash.   Neurological:  Positive for tremors. Negative for dizziness and weakness.   Psychiatric/Behavioral:  Negative for depression. The patient is not nervous/anxious.         Objective:     Vitals:    10/15/24 0354   BP: (!) 155/75   Pulse: 98   Resp: 17   Temp: 98.9 °F (37.2 °C)         Constitutional: No acute distress, AAOx3. Mild tremors.  HENT: Normal, atraumatic  Eyes: Scleral icterus. Pupils equal and reactive.  Cardiovascular: Normal rate, regular rhythm, no murmurs.  Respiratory: No distress, stable on room air  GI: Soft, non-distended, non-tender to palpation  Musculoskeletal: ROM intact, no muscle wasting  Skin: Jaundiced. Multiple diffuse psoriatic lesions on arms and legs.  Neurological: No focal deficits. Mentation intact  Psychiatric: Mood normal. Affect normal.    Significant Labs:  Recent Labs   Lab 10/14/24  1840   HGB 13.7*       Lab Results   Component Value Date    WBC 11.70 10/14/2024    HGB 13.7 (L) 10/14/2024    HCT 41 10/14/2024    MCV 99 (H) 10/14/2024     10/14/2024       Lab Results   Component Value Date     10/14/2024    K 3.7 10/14/2024     10/14/2024    CO2 23 10/14/2024    BUN 6 10/14/2024    CREATININE 0.8 10/14/2024    CALCIUM 9.0 10/14/2024    ANIONGAP 14 10/14/2024       Lab Results   Component Value Date    ALT 27 10/14/2024     (H) 10/14/2024    ALKPHOS 240 (H) 10/14/2024    BILITOT 15.8 (H) 10/14/2024       Lab Results   Component Value Date    INR 1.5 (H) 10/14/2024       Significant Imaging:  Reviewed pertinent radiology findings.       Assessment/Plan:     34M with history of EtOH use disorder who presents with decompensated alcohol-related cirrhosis with signs of jaundice and scleral icterus. His labs with elevated AST to 131 , and T-bili 15.8.  INR 1.5. He has never had these issues in the past, and this is a new diagnosis for the patient. He reports a longstanding history of heavy and regular alcohol consumption for the past 7 years or so. No history of autoimmune disease, prior liver disease, no IVDU or recent tattoos (two tattoos in distant past) and reports prior DUI when he was < 21 years old that was expunged.  Imaging shows hepatomegaly of 25cm with heterogeneous echotexture and nodular contour. No focal lesions seen. Portal hypertensive changes were seen as well on ultrasound. His presentation is consistent with decompensated alcohol-related cirrhosis possibly precipitated by or in the setting of acute alcoholic hepatitis.      Problem List:  Decompensated alcohol-related cirrhosis  Elevated LFTs, suspected alcoholic hepatitis  Coagulopathy, INR 1.5  Portal hypertension  Alcohol use disorder  Elevated BMI of 29      Recommendations:  - Please start Ceftriaxone 1g q24h for prophylaxis.  - Daily PT/INR, CMP and CBC while admitted  - PETH ordered  - If able to, would obtain diagnostic paracentesis with fluids studies.  - Serologies ordered: alpha-1 antitrypsin, ceruloplasmin, RODRIGO, anti-mitochondrial antibodies, anti-smooth muscle antibodies, hepatitis panel, HSV, CMV, EBV, IgG levels, AFP  - Consider Nutrition consult for optimization of diet, and continue thiamine supplementation.  - Will arrange for close follow-up with Hepatology on discharge. Patient was counseled on alcohol cessation and it was strongly recommended that he stop drinking.  - Consider Dermatology consult for his diffuse, untreated psoriasis.      Thank you for involving us in the care of Fermin Moreno. Please call with any additional questions, concerns or changes in the patient's clinical status. We will continue to follow.      Varun Mathias MD  Internal Medicine PGY3  Hepatology

## 2024-10-15 NOTE — ASSESSMENT & PLAN NOTE
Tachycardia   Alcoholic liver cirrhosis    Patient with known Cirrhosis with Child's class 9. Co-morbidities are present and inclusive of ascites, portal hypertension, esophageal varices, anemia/pancytopenia, and anticoagulation.  MELD-Na score calculated; MELD 3.0: 24 at 10/14/2024 11:37 PM  MELD-Na: 21 at 10/14/2024 11:37 PM  Calculated from:  Serum Creatinine: 0.8 mg/dL (Using min of 1 mg/dL) at 10/14/2024 11:37 PM  Serum Sodium: 139 mmol/L (Using max of 137 mmol/L) at 10/14/2024 11:37 PM  Total Bilirubin: 15.8 mg/dL at 10/14/2024 11:37 PM  Serum Albumin: 2.8 g/dL at 10/14/2024 11:37 PM  INR(ratio): 1.5 at 10/14/2024  6:40 PM  Age at listing (hypothetical): 34 years  Sex: Male at 10/14/2024 11:37 PM      Continue chronic meds. Etiology likely ETOH. Will avoid any hepatotoxic meds, and monitor CBC/CMP/INR for synthetic function.     Patient presents with hyperbilirubinemia, bilirubin in urine, and jaundice.   8 years heavy alcohol abuse   - tachy to 133 on arrival, improved with 1L LR; hold off on further IVF for now given ascites   - CT abd pelvis with evidence of cirrhosis, mild ascites, portal HTN, varices   - PT 15.9/ INR 1.5   - Alcohol <10 (last drink 5 days ago)  - check U tox, acetaminophen level, ammonia, lipid panel   - neg hep c and HIV   - check liver doppler US   - hepatology consult   - limit tylenol, NSAIDS   - start thiamine, MV, folate   - CIWA monitoring; prn valium for CIWA >8  - addiction psych consulted for education/ assistance

## 2024-10-15 NOTE — CONSULTS
[facility] - Telemetry [facility] Medicine  Consult Note    Patient Name: Fermin Moreno  MRN: [id number]  Admission Date: 10/14/2024  Hospital Length of Stay: 0 days  Attending Physician: Ena Stewart MD   Primary Care Provider: Beverly Jimenez III, MD       Inpatient consult to [facility] Medicine-General  Consult performed by: Sabina Mckeon MD  Consult ordered by: Ena Stewart MD            Received this consult for diagnostic and potentially therapeutic paracentesis. Ultrasound performed at the bedside, no ascites noted in the anterior abdomen nor in the bilateral paracolic gutters. 1cm x 1cm pocket of anechoic ascites noted in the hepatorenal recess, not amenable to paracentesis.     Dr. Stewart updated.     Thank you for your consult. I will sign off. Please contact us if you have any additional questions.    Sabina Mckeon MD, MPH, FACP  Senior Physician, Department of Hospital Medicine  [facility]  [address], [place], [place]

## 2024-10-15 NOTE — PLAN OF CARE
SW attempted to complete MAITE with patient//patient sleeping. SW will try to re-attempt at a later time.     COLIN Batista  [facility]   Ext: [phone]

## 2024-10-15 NOTE — ED PROVIDER NOTES
Encounter Date: 10/14/2024       History     Chief Complaint   Patient presents with    Abnormal Lab     Liver enzymes elevated, last etoh about week ago, took 3 bactrim thought had uti urine was dark     34 male PMH of Psoriasis, alcohol abuse presents to ED w/ complaint of dysuria x2 weeks. He started to have urinary urgency, frequency, and dark discoloration over the last week and a half along with some subjective fevers, chills, nausea, and scleral icterus. He also quit drinking approx 7 days ago and before that was drinking approx 12 beers daily. He denied current sx of withdrawal including tremors, sweating, seizures. He also denied other sx such as cough, SOB, chest pain, or abdominal pain. He believed his sx were due to UTI, but on presentation to urgent care he was referred to ED because his UA showed high levels of ketones.     The history is provided by the patient. No  was used.     Review of patient's allergies indicates:  No Known Allergies  Past Medical History:   Diagnosis Date    ADD (attention deficit disorder)     Psoriasis      History reviewed. No pertinent surgical history.  No family history on file.  Social History     Tobacco Use    Smoking status: Every Day     Types: Vaping with nicotine     Passive exposure: Current    Smokeless tobacco: Never   Substance Use Topics    Alcohol use: Yes     Comment: Occ    Drug use: Never     Review of Systems    Physical Exam     Initial Vitals [10/14/24 1815]   BP Pulse Resp Temp SpO2   (!) 166/101 (!) 133 20 98.6 °F (37 °C) 100 %      MAP       --         Physical Exam    HENT:   Head: Normocephalic and atraumatic.   Kernicterus present   Eyes: Scleral icterus is present.   Kernicterus present   Cardiovascular:  Intact distal pulses.           -tachycardic, normal rhythm   Pulmonary/Chest: Breath sounds normal. No respiratory distress.   Abdominal: Bowel sounds are normal.   -palpable liver edge  -no tenderness to palpation  throughout the abdomen     Neurological: He is alert and oriented to person, place, and time.   Skin: Capillary refill takes 2 to 3 seconds.         ED Course   Procedures  Labs Reviewed   CBC W/ AUTO DIFFERENTIAL - Abnormal       Result Value    WBC 11.70      RBC 3.77 (*)     Hemoglobin 13.7 (*)     Hematocrit 37.3 (*)     MCV 99 (*)     MCH 36.3 (*)     MCHC 36.7 (*)     RDW 17.8 (*)     Platelets 211      MPV 11.5      Immature Granulocytes 0.3      Gran # (ANC) 8.8 (*)     Immature Grans (Abs) 0.03      Lymph # 1.3      Mono # 1.4 (*)     Eos # 0.0      Baso # 0.06      nRBC 0      Gran % 75.5 (*)     Lymph % 11.4 (*)     Mono % 12.1      Eosinophil % 0.2      Basophil % 0.5      Differential Method Automated      Narrative:     Release to patient->Immediate   PROTIME-INR - Abnormal    Prothrombin Time 15.9 (*)     INR 1.5 (*)     Narrative:     Release to patient->Immediate   URINALYSIS, REFLEX TO URINE CULTURE - Abnormal    Specimen UA Urine, Clean Catch      Color, UA Orange (*)     Appearance, UA Hazy (*)     pH, UA 6.0      Specific Gravity, UA >1.030 (*)     Protein, UA 1+ (*)     Glucose, UA Negative      Ketones, UA 1+ (*)     Bilirubin (UA) 3+ (*)     Occult Blood UA 1+ (*)     Nitrite, UA Negative      Leukocytes, UA Negative      Narrative:     Specimen Source->Urine   COMPREHENSIVE METABOLIC PANEL - Abnormal    Sodium 139      Potassium 3.7      Chloride 102      CO2 23      Glucose 85      BUN 6      Creatinine 0.8      Calcium 9.0      Total Protein 8.1      Albumin 2.8 (*)     Total Bilirubin 15.8 (*)     Alkaline Phosphatase 240 (*)      (*)     ALT 27      eGFR >60.0      Anion Gap 14     BILIRUBIN, DIRECT - Abnormal    Bilirubin, Direct 11.3 (*)     Narrative:     ADD ON DIRECT BILIRUBIN PER DR HAIM BOWLING/ORDER# [id number] @ 2:18AM   ISTAT LACTATE - Abnormal    POC Lactate 2.46 (*)     Sample VENOUS      Site Other      Allens Test N/A      DelSys Room Air      Mode SPONT      FiO2 21      ISTAT PROCEDURE - Abnormal    POC Glucose 92      POC BUN 6      POC Creatinine 0.9      POC Sodium 132 (*)     POC Potassium 8.1 (*)     POC Chloride 103      POC TCO2 (MEASURED) 24      POC Ionized Calcium 0.89 (*)     POC Hematocrit 41      Sample TREVOR     ALCOHOL,MEDICAL (ETHANOL)    Alcohol, Serum <10      Narrative:     Release to patient->Immediate   HIV 1 / 2 ANTIBODY    HIV 1/2 Ag/Ab Non-reactive      Narrative:     Release to patient->Immediate   HEPATITIS C ANTIBODY    Hepatitis C Ab Non-reactive      Narrative:     Release to patient->Immediate   PHOSPHORUS    Phosphorus 3.9     MAGNESIUM    Magnesium 1.8     URINALYSIS MICROSCOPIC    RBC, UA 4      WBC, UA 2      Bacteria Rare      Squam Epithel, UA 2      Hyaline Casts, UA 0      Microscopic Comment SEE COMMENT      Narrative:     Specimen Source->Urine   BILIRUBIN, DIRECT        ECG Results              EKG 12-lead (Final result)        Collection Time Result Time QRS Duration OHS QTC Calculation    10/14/24 18:20:20 10/15/24 08:39:58 88 473                     Final result by Interface, Lab In [facility] (10/15/24 08:40:02)                   Narrative:    Test Reason : R00.0,    Vent. Rate : 127 BPM     Atrial Rate : 127 BPM     P-R Int : 128 ms          QRS Dur : 088 ms      QT Int : 326 ms       P-R-T Axes : 041 -09 032 degrees     QTc Int : 473 ms    Sinus tachycardia  Minimal voltage criteria for LVH, may be normal variant ( R in aVL )  Borderline Abnormal ECG  No previous ECGs available  Confirmed by Ambrocio Montalvo MD ([id number]) on 10/15/2024 8:39:56 AM    Referred By: [email or web address]   SELF           Confirmed By:Ambrocio Montalvo MD                                  Imaging Results               US Liver with Doppler (xpd) (Final result)  Result time 10/15/24 06:49:33      Final result by Ambrocio Ritchie MD (10/15/24 06:49:33)                   Impression:      Cirrhotic morphology with sequela of portal hypertension including small volume ascites,  splenomegaly, patent paraumbilical vein, collateral vessels, and reversed flow in the splenic vein, right and left portal veins.    Partially occlusive thrombus versus sluggish flow in the superior mesenteric, splenic, main portal, and right left portal veins.    Distended gallbladder with wall thickening, likely related to underlying liver dysfunction.    This report was flagged in Epic as abnormal.    Electronically signed by resident: Fer Ontiveros  Date:    10/15/2024  Time:    05:50    Electronically signed by: Ambrocio Ritchie  Date:    10/15/2024  Time:    06:49               Narrative:    EXAMINATION:  US LIVER WITH DOPPLER    CLINICAL HISTORY:  decompensated cirrhosis;    TECHNIQUE:  Liver Doppler ultrasound was performed.    COMPARISON:  CT abdomen pelvis 10/14/2024    FINDINGS:  Liver: Enlarged, measuring 24.7 cm. Heterogeneous echotexture and nodular contour.  No focal hepatic lesions.    Hepatic vasculature:There is reversal of flow in the splenic vein, right portal vein, and left portal vein.  There is partially occlusive thrombus versus sluggish flow in the superior mesenteric vein, splenic vein, main portal vein, and right and left portal veins.  Hepatic veins and IVC are patent with proper directional flow. Hepatic arteries are patent with normal waveforms. There is a patent paraumbilical vein.  Collateral vessels at the splenic hilum.    Biliary: Gallbladder is distended with circumferential wall thickening.  There is gallbladder sludge.  No gallstones.  No biliary ductal dilatation. CBD measures 4 mm.    Spleen: Enlarged, measuring 17.2 x 5.1 cm. Homogeneous parenchymal echotexture.    Pancreas: Visualized portions demonstrate normal contours.    Miscellaneous: Small volume ascites.                                        CT Abdomen Pelvis With IV Contrast NO Oral Contrast (Final result)  Result time 10/15/24 01:48:04      Final result by Francisco Champion MD (10/15/24 01:48:04)                    Impression:      Morphologic changes of chronic liver disease with diffuse heterogeneous attenuation of the liver parenchyma suggestive of underlying liver dysfunction.  Associated stigmata of portal hypertension including splenomegaly, upper abdominal and gastroesophageal varices, and small volume ascites.  Suggest correlation with risk factors and LFTs.  Multiphase MRI follow-up for hepatocellular carcinoma screening could be considered if clinically appropriate.    Gallbladder distension with mild inflammatory change and pericholecystic free fluid.  Findings could be seen in the setting of liver dysfunction though suggest correlation for any clinical signs of cholecystitis.    Additional findings discussed in the body of the report.    This report was flagged in Epic as abnormal.    Electronically signed by resident: Fer Ontiveros  Date:    10/15/2024  Time:    00:58    Electronically signed by: Francisco Champion MD  Date:    10/15/2024  Time:    01:48               Narrative:    EXAMINATION:  CT ABDOMEN PELVIS WITH IV CONTRAST    CLINICAL HISTORY:  Sepsis;    TECHNIQUE:  Axial images of the abdomen and pelvis were acquired  after the use of 100 cc Omnipaque 350 IV contrast. No oral contrast was administered.  Coronal and sagittal reconstructions were also obtained.    COMPARISON:  None.    FINDINGS:  LUNG BASES: Partially imaged heart and pericardium are within normal limits.  Lung bases are clear.    HEPATOBILIARY: Hepatomegaly measuring 25.2 cm craniocaudal length.  There is heterogeneous attenuation of the liver suggesting underlying liver dysfunction.  There is also minimal irregular contour of the liver with lobar redistribution, fissural widening, and caudate lobe hypertrophy.  No focal liver lesions on this single phase study.  Gallbladder is distended with wall hyperenhancement, pericholecystic fluid and inflammatory change.  No calcified gallstones identified.  CBD is normal caliber.  No intrahepatic bile  duct dilatation.    SPLEEN: Splenomegaly measuring 14.9 cm craniocaudal.  No focal lesions.    PANCREAS: No peripancreatic inflammatory change, though evaluation is limited by the presence of free fluid and mesenteric edema.  No mass or duct dilatation.    ADRENALS: No adrenal nodules.    KIDNEYS/URETERS: Kidneys enhance symmetrically.  No stones or hydronephrosis.  No solid mass lesions.    BLADDER/PELVIC ORGANS: No bladder wall thickening.    PERITONEUM / RETROPERITONEUM: Small volume abdominopelvic free fluid.  No free air.  No organized fluid collections.    LYMPH NODES: No lymphadenopathy.    VESSELS: Mild atherosclerosis.  No aortic aneurysm.  Portal veins are patent.  Paraesophageal and periumbilical collateral vessels.  Recanalized umbilical vein.  No portal venous gas.    GI TRACT: Minimal wall prominence of the lower esophagus.  Associated esophageal varices.  No bowel distention or wall thickening.  Scattered colonic diverticula.  Normal appendix.    SOFT TISSUES: Unremarkable.    BONES: No fractures or focal osseous lesions.  Bilateral pars defects at L5.  Minimal anterolisthesis of L5 on S1.  Bifid spinous process at L5.                                       Medications   albuterol-ipratropium 2.5 mg-0.5 mg/3 mL nebulizer solution 3 mL (has no administration in time range)   melatonin tablet 9 mg (has no administration in time range)   ondansetron disintegrating tablet 8 mg (has no administration in time range)   prochlorperazine injection Soln 5 mg (has no administration in time range)   acetaminophen tablet 650 mg (has no administration in time range)   aluminum-magnesium hydroxide-simethicone 200-200-20 mg/5 mL suspension 30 mL (has no administration in time range)   naloxone 0.4 mg/mL injection 0.02 mg (has no administration in time range)   glucose chewable tablet 16 g (has no administration in time range)   glucose chewable tablet 24 g (has no administration in time range)   glucagon (human  recombinant) injection 1 mg (has no administration in time range)   nicotine 14 mg/24 hr 1 patch (has no administration in time range)   dextrose 10% bolus 125 mL 125 mL (has no administration in time range)   dextrose 10% bolus 250 mL 250 mL (has no administration in time range)   thiamine tablet 100 mg (100 mg Oral Given 10/15/24 0835)   multivitamin tablet (1 tablet Oral Given 10/15/24 0835)   diazePAM tablet 10 mg (has no administration in time range)   cefTRIAXone (Rocephin) 1 g in D5W 100 mL IVPB (MB+) (has no administration in time range)   lactulose 20 gram/30 mL solution Soln 15 g (has no administration in time range)   lactated ringers bolus 1,000 mL (0 mLs Intravenous Stopped 10/15/24 0108)   iohexoL (OMNIPAQUE 350) injection 100 mL (100 mLs Intravenous Given 10/14/24 2357)     Medical Decision Making  34 male PMH psoriasis, alcohol abuse presents w/ urinary urgency, frequency, and subjective fevers and chills x2 weeks with scleral icterus. DDX includes but not limited to alcoholic hepatitis, UTI, Sepsis, Pancreatitis. Workup to include CBC, CMP, Lactate, Mg, Phos, PT/INR, UA w/ culture. EKG, CT abd/pelvis w/ contrast    Sepsis unlikely given pt has been afebrile, WBC 11, and does not meet SIRS criteria. UTI unlikely given that UA was negative for nitrites or leukocytes and pt has no suprapubic tenderness. Pancreatitis not likely given that pt has not had any epigastric abdominal pain, and mild nausea but no vomiting or problems with PO intake. Sx thought to be related to chronic liver disease from alcohol use. CMP showed elevated tbili 15.8 with elevated LFT's. UA positive for ketones and bilirubin. CT Abd/Pelvis w/ contrast done that showed evidence of chronic liver disease as well as concern for possible portal HTN and gastroesophageal varices. CT also showing evidence of possible cholecystitis, although exam is benign and pt does not complain of current abd pain. He was found to have mildly elevated  lactate of 2.4. He received 1L LR bolus, and he is now being admitted to  for further mgmt of his chronic liver disease w/ new onset jaundice.    Amount and/or Complexity of Data Reviewed  Labs: ordered. Decision-making details documented in ED Course.  Radiology: ordered and independent interpretation performed.     Details: CT Abd/Pelvis w/ contrast showing diffuse hepatosplenomegaly  ECG/medicine tests:  Decision-making details documented in ED Course.    Risk  Prescription drug management.              Attending Attestation:   Physician Attestation Statement for Resident:  As the supervising MD   Physician Attestation Statement: I have personally seen and examined this patient.   I agree with the above history.  -:   As the supervising MD I agree with the above PE.     As the supervising MD I agree with the above treatment, course, plan, and disposition.   -: POC potassium was hemolyzed, repeat was normal.  Chronic liver disease from EtOH use, now with biliary obstruction.   to admit.                     ED Course as of 10/15/24 1018   Mon Oct 14, 2024   2115 EKG 12-lead  Sinus tachycardia @ 127 bpm, no acute ischemia per my independent interpretation.     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

## 2024-10-15 NOTE — HPI
"Fermin Moreno is a 34 male PMH of Psoriasis, alcohol abuse being admitted to  for hyperbilirubinemia and jaundice. Patient initially presented to ED with CC dark urine and mild dysuria x 1.5 weeks. Associated chills, sweats, tremors, anxiety, nausea, and yellowing of this skin/ eyes. Patient endorses about 8 years of heavy drinking. First, 3-4 years drinking ~1 pint vodka daily followed by 4 years of drinking ~12 beers daily. Last drink was 5 days ago. Denies documented fevers, confusion, hallucinations, LOC, seizures, lightheadedness, lethargy, abdominal swelling, leg swelling, SOB, chest pain, bowel changes, vomiting, flank pain, bleeding. Endorses mild intermittent LLQ pain, resolved at time of admission. Patient denies known hx of liver disease, heart disease, kidney disease. He does not follow with a PCP and has never seen a hepatologist.     In the ED: , /101, improved after 1 L LR bolus. Afebrile. WBC 11.7K. Hgb 13.7/ Hct 37.3, MCV 99. PT 15.9/ INR 1.5. . Albumin 2.8. T bili 15.8. Bili direct 11.3. / ALT 27. Alcohol <10. UA with 1+ protein, 1+ ketones, 1+ glucose, 3+ bilirubin; non-infectious. CT abd pelvis w contrast: "Morphologic changes of chronic liver disease with diffuse heterogeneous attenuation of the liver parenchyma suggestive of underlying liver dysfunction.  Associated stigmata of portal hypertension including splenomegaly, upper abdominal and gastroesophageal varices, and small volume ascites.  Suggest correlation with risk factors and LFTs.  Multiphase MRI follow-up for hepatocellular carcinoma screening could be considered if clinically appropriate. Gallbladder distension with mild inflammatory change and pericholecystic free fluid.  Findings could be seen in the setting of liver dysfunction though suggest correlation for any clinical signs of cholecystitis."   "

## 2024-10-15 NOTE — MEDICAL/APP STUDENT
[facility]   DEPARTMENT OF PSYCHIATRY     IDENTIFIERS & DEMOGRAPHICS:     SERVICE: Addiction  ENCOUNTER: initial    -- PATIENT IDENTIFIERS: Fermin Moreno  [id number]  1990  34 y.o.  male  -- PRESENT WITH PATIENT DURING SESSION: partner  -- SOURCES OF INFORMATION: PATIENT  -- ENCOUNTER PROVIDER: Alexandria Alas        PRESENTATION:   History of Present Illness   **   HPI (Overview  Subjective  Current Findings  Interval Hx & Course  Chart Review  Collateral):    33 yo M with past psychiatric history of alcohol use disorder and ADD with new diagnosis of liver cirrhosis 2/2 alcohol abuse who presents for decompensated cirrhosis. Consulted for alcohol abuse education.     Chart reviewed and patient seen at the bedside with his spouse. He was unaware the consult to addiction psychiatry was placed but was amenable to conversation. Patient describes consistent drinking for the past 8 years, currently drinking about 12 beers daily. His last drink was 5 days ago ~10/10/2024. He denies any complicated withdrawals or history of seizure. He originally presented to urgent care with c/o dark urine, dysuria, jaundice, chills, sweats, tremors, anxiety, and nausea. He has intermittent bouts of anxiety but does not take any psychiatric medications. Upon discussion regarding treatment options such as inpatient rehab or intensive outpatient programs, patient expressed neutral opinion. He expressed feeling overwhelmed and not being in a place to make a decision. He was counseled on medication assisted treatment options which would be best managed by an outpatient psychiatrist. He does not currently have an outpatient psychiatrist but is open to seeing one. Patient was educated on complete abstinence of alcohol moving forwards due to his liver cirrhosis and to prevent the need for a transplant in the future. Patient expressed understanding and was in agreement. Overall patient was processing the information and new  diagnosis but was accepting and thankful for the resources provided. He was advised to ask his primary team if any questions arose regarding addiction psychiatry resources.     Per Primary Team:   Fermin Moreno is a 34 male PMH of Psoriasis, alcohol abuse being admitted to  for hyperbilirubinemia and jaundice. Patient initially presented to ED with CC dark urine and mild dysuria x 1.5 weeks. Associated chills, sweats, tremors, anxiety, nausea, and yellowing of this skin/ eyes. Patient endorses about 8 years of heavy drinking. First, 3-4 years drinking ~1 pint vodka daily followed by 4 years of drinking ~12 beers daily. Last drink was 5 days ago. Denies documented fevers, confusion, hallucinations, LOC, seizures, lightheadedness, lethargy, abdominal swelling, leg swelling, SOB, chest pain, bowel changes, vomiting, flank pain, bleeding. Endorses mild intermittent LLQ pain, resolved at time of admission. Patient denies known hx of liver disease, heart disease, kidney disease. He does not follow with a PCP and has never seen a hepatologist.      REVIEW OF SYSTEMS:     PSYCHIATRIC ROS:  Sleep Disturbance/Disruption: NO **  Appetite/Weight Change: NO **  Alterations in Energy Level: NO **  Impaired Focus/Concentration: NO **  Depressive Symptomatology: NO **  Excessive Anxiety/Worry: YES **  Dysregulated Mood/Behavior: NO **  Manic Symptomatology: NO **  Psychosis: NO **  Trauma-Related & Dissociation: not assessed **  Impulsivity/Compulsivity/Obsessionality: not assessed **  Disordered Eating: not assessed **    Additional Pertinent Positives/Negatives, As Applicable: **    MEDICAL ROS:  I attest that I have reviewed the Review of Systems completed by Alize Doyle PA-C on 10/15 and agree with the findings; no pertinent changes/updates are noted.      ADD-ON PSYCHOTHERAPY:     ADD-ON THERAPY     HISTORY:   Patient Information   **  PAST PSYCHIATRIC HISTORY:  Previous/Pre-Existing Psychiatric Diagnoses: YES ADD  Past  Psychotropic Trials: YES Adderall  Current Psychiatric Provider: NO **  Hx of Outpatient Psychiatric Treatment: NO **  Hx of Psychiatric Hospitalization: NO **  Documented Hx of Malingering: NO **  Hx of Suicidal Ideation/Threats: NO **  Hx of Suicide Attempts/Gestures: NO **  Hx of Perpetrated Violence: not assessed **  Hx of Psychosis: NO **  Hx of Bipolar Diathesis: NO **  Hx of Depression: NO **  Hx of Trauma: not assessed **  Hx of Abuse: not assessed **  Ready Access to Firearms: NO **    SUBSTANCE USE HISTORY:  Hx of Nicotine Use: NO **  Recent Alcohol Consumption: daily, heavy drinking, ~ 15 or more drinks/weekly **  Drug Experimentation/Usage: denies **  Hx of Alcohol Misuse/Abuse: YES, +precontemplation stage (readiness to change) **  Hx of Illicit Drug Misuse/Abuse: NO **  Hx of Prescription Drug Misuse/Abuse: not assessed **  Hx of Formal HENRRY Treatment: NO **       FAMILY HISTORY:  Psychiatric/Substance Hx: not assessed **  Hx of Suicide: NO **       SOCIAL HISTORY:  Developmental Delay/Disability: not assessed **  GED/High School Diploma: YES **  Currently Employed: not assessed **  Currently on Disability: NO **  Financially Stable: not assessed **  Functions Independently: YES **  Domiciled: YES **  Intact Support System: YES **  Currently in a Relationship: YES **  Ever : YES, currently  **  Children/Dependents: not assessed **  Uatsdin/Spiritual: not assessed **  Hobbies/Recreational Activities: not assessed **  Hx of  Service: not assessed **    LEGAL HISTORY:  Ever Charged/Convicted: not assessed **  Hx of Incarceration: not assessed **    NEUROLOGIC HISTORY:  Hx of Seizures: NO **  Hx of Head Trauma: NO **    MEDICAL HISTORY:  Medical Hx & Diagnoses: YES **  Allergies: NO **    Additional Relevant History, As Applicable: **    The patient's past medical history has been reviewed and updated as appropriate within the electronic health record system.  See PROBLEM LIST &  HISTORY for details.    Scheduled and PRN Medications: The electronic chart was reviewed and updated as appropriate.  See MEDCARD for details.    Allergies:  Patient has no known allergies.      EXAMINATION:   Objective   **  VITALS:  BP (!) 141/83 (BP Location: Right arm, Patient Position: Lying)   Pulse 109   Temp 98.1 °F (36.7 °C) (Oral)   Resp 18   Ht 6' (1.829 m)   Wt 97.8 kg (215 lb 9.8 oz)   SpO2 100%   BMI 29.24 kg/m²     MENTAL STATUS EXAMINATION:  General Appearance: adequately groomed, in no apparent distress **  Behavior & Attitude:  **  Movements & Motor Activity:  **  Gait & Station:  **  Muscle Strength & Tone:  **  Speech:  **  Language:  **  Mood:  **  Affect:  **  Thought Process:  **  Associations & Abstraction:  **  Thought Content & Perceptions:  **  Arousal & Sensorium:  **  Orientation:  **  Recent & Remote Memory:  **  Attention & Concentration:  **  Fund of Knowledge:  **  Insight:  **  Judgment:  **      RISK & REGULATORY:   Impression   **  RISK PARAMETERS:  Suicidal Ideation/Threats: NO **  Suicide Attempts/Gestures: NO **  Homicidal Ideation/Threats: NO **  Homicidal Behavior: NO **  Non-Suicidal Self-Injurious Behavior: NO **  Perpetrated Violence: NO **    LEGAL  CERTIFICATION:  Danger to Self: NO **  Danger to Other: NO **  Gravely Disabled: NO **    NOTE: RISK PARAMETERS are current to the encounter/episode  NOT inclusive of past history.    REGULATORY    ASSESSMENT & PLAN:   Assessment & Plan   **  DIAGNOSES & PROBLEMS:        PSYCHOTROPIC REGIMEN:  []Continue  []Adjust  []Initiate  []Defer  []D/C  []N/A         A&P (Synthesis  Analysis  Summation  Dispo  Goals  Recs & Mgmt):            CHART REVIEW: available documentation has been reviewed, and pertinent elements of the chart have been incorporated into this evaluation where appropriate.       KEY & LINKS:        Y  = yes/endorses     N  = no/denies     U  = unknown/unable to assess    ADHD   AIMS   AUDIT    AUDIT-C   C-SSRS (Screen)   C-SSRS (Short)   C-SSRS (Full)   DAST   DAST-10   LIA-7   MoCA   PCL-5   PHQ-9   HENRRY   YMRS       DIAGNOSTIC TESTING:   Results   **   Glu 85  10/14/2024  Li *   *  TSH *   *    HgA1c *   *  VPA *   *   FT4 *   *    Na 139  10/14/2024  CLZ *   *  WBC 11.70  10/14/2024    Cr 0.8  10/14/2024  ANC 8.8; 75.5 (H);  (H)  10/14/2024   Hgb 13.7 (L)  10/14/2024     BUN 6  10/14/2024  Trop I *   *  HCT 41  10/14/2024     GFR >60.0  10/14/2024   CPK *   *    10/14/2024     Alb 2.8 (L)  10/14/2024   PRL *   *  B12 *   *     T Bili 15.8 (H)  10/14/2024  Chol *   *  B9 *   *     (H)  10/14/2024  TGs *   *  B1 *   *     (H)  10/14/2024  HDL *   *  Vit D *   *     ALT 27  10/14/2024  LDL *   *  HIV Non-reactive  10/14/2024     INR 1.5 (H)  10/14/2024  Lois *   *   Hep C Non-reactive  10/14/2024    GGT *   *  Lip *   *  RPR *   *    MCV 99 (H)  10/14/2024   NH4 *   *  UPT *   *      PETH *   *  THC *   *    ETOH <10  10/14/2024  MYLES *   *    EtG *   *  AMP *   *    ALC *   *  OPI *   *    BZO *   *  MTD *   *     BAR *   *  BUP *   *    PCP *   *  FEN *   *     Results for orders placed or performed during the hospital encounter of 10/14/24   EKG 12-lead    Collection Time: 10/14/24  6:20 PM   Result Value Ref Range    QRS Duration 88 ms    [facility] QTC Calculation 473 ms    Narrative    Test Reason : R00.0,    Vent. Rate : 127 BPM     Atrial Rate : 127 BPM     P-R Int : 128 ms          QRS Dur : 088 ms      QT Int : 326 ms       P-R-T Axes : 041 -09 032 degrees     QTc Int : 473 ms    Sinus tachycardia  Minimal voltage criteria for LVH, may be normal variant ( R in aVL )  Borderline Abnormal ECG  No previous ECGs available  Confirmed by Selvin WOLFF, Ambrocio ([id number]) on 10/15/2024 8:39:56 AM    Referred By: [email or web address]   SELF           Confirmed By:Ambrocio Montalvo MD         Consults  [facility] [employer]*

## 2024-10-15 NOTE — ED NOTES
Fermin Moreno, a 34 y.o. male presents to the ED w/ complaint of abnormal lab.     Pt states PCP advised go to ED immediately due to liver labs.     Triage note:  Chief Complaint   Patient presents with    Abnormal Lab     Liver enzymes elevated, last etoh about week ago, took 3 bactrim thought had uti urine was dark     Review of patient's allergies indicates:  No Known Allergies  Past Medical History:   Diagnosis Date    ADD (attention deficit disorder)     Psoriasis

## 2024-10-15 NOTE — PLAN OF CARE
Patient admitted for decompensated cirrhosis.  Evaluated by hepatology appreciate recommendations.  Paracentesis attempted however no pocket for safe paracentesis.  Ammonia of greater than 100 however mentation intact.  We will start on low dose of lactulose.   Monitor MELD. Started on Rocephin for SBP ppx.    Addiction psychiatry evaluated the patient.  Appreciate recommendations.    Regarding extensive psoriasis, discussed with dermatology, given no acuity and no signs of acute infection recommendation for outpatient follow up. Recommended topical steroid for psoriasis is triamcinolone 0.1% ointment for the body and hydrocortisone 2.5% ointment for the axillae/groin.     Reports last drink 7 days ago. Not scoring on CIWA.

## 2024-10-15 NOTE — H&P
"  [facility]  History & Physical    Patient Name: Fermin Moreno  MRN: [id number]  Patient Class: OP- Observation  Admission Date: 10/14/2024  Attending Physician: Ena Stewart MD   Primary Care Provider: Beverly Jimenez III, MD         Patient information was obtained from patient, past medical records, and ER records.     Subjective:     Principal Problem:Decompensated cirrhosis    Chief Complaint:   Chief Complaint   Patient presents with    Abnormal Lab     Liver enzymes elevated, last etoh about week ago, took 3 bactrim thought had uti urine was dark        HPI: Fermin Moreno is a 34 male PMH of Psoriasis, alcohol abuse being admitted to  for hyperbilirubinemia and jaundice. Patient initially presented to ED with CC dark urine and mild dysuria x 1.5 weeks. Associated chills, sweats, tremors, anxiety, nausea, and yellowing of this skin/ eyes. Patient endorses about 8 years of heavy drinking. First, 3-4 years drinking ~1 pint vodka daily followed by 4 years of drinking ~12 beers daily. Last drink was 5 days ago. Denies documented fevers, confusion, hallucinations, LOC, seizures, lightheadedness, lethargy, abdominal swelling, leg swelling, SOB, chest pain, bowel changes, vomiting, flank pain, bleeding. Endorses mild intermittent LLQ pain, resolved at time of admission. Patient denies known hx of liver disease, heart disease, kidney disease. He does not follow with a PCP and has never seen a hepatologist.     In the ED: , /101, improved after 1 L LR bolus. Afebrile. WBC 11.7K. Hgb 13.7/ Hct 37.3, MCV 99. PT 15.9/ INR 1.5. . Albumin 2.8. T bili 15.8. Bili direct 11.3. / ALT 27. Alcohol <10. UA with 1+ protein, 1+ ketones, 1+ glucose, 3+ bilirubin; non-infectious. CT abd pelvis w contrast: "Morphologic changes of chronic liver disease with diffuse heterogeneous attenuation of the liver parenchyma suggestive of underlying liver dysfunction.  Associated stigmata " "of portal hypertension including splenomegaly, upper abdominal and gastroesophageal varices, and small volume ascites.  Suggest correlation with risk factors and LFTs.  Multiphase MRI follow-up for hepatocellular carcinoma screening could be considered if clinically appropriate. Gallbladder distension with mild inflammatory change and pericholecystic free fluid.  Findings could be seen in the setting of liver dysfunction though suggest correlation for any clinical signs of cholecystitis."     Past Medical History:   Diagnosis Date    ADD (attention deficit disorder)     Psoriasis        History reviewed. No pertinent surgical history.    Review of patient's allergies indicates:  No Known Allergies    No current facility-administered medications on file prior to encounter.     Current Outpatient Medications on File Prior to Encounter   Medication Sig    dextroamphetamine-amphetamine (AMPHETAMINE SALT COMBO) 20 mg tablet Take 1 tablet (20 mg total) by mouth 2 (two) times daily as needed. (Patient not taking: Reported on 10/14/2024)     Family History    None       Tobacco Use    Smoking status: Every Day     Types: Vaping with nicotine     Passive exposure: Current    Smokeless tobacco: Never   Substance and Sexual Activity    Alcohol use: Yes     Comment: Occ    Drug use: Never    Sexual activity: Not on file     Review of Systems   Constitutional:  Positive for chills, diaphoresis (mild sweats) and fever (subjective). Negative for activity change.   HENT:  Negative for trouble swallowing.    Eyes:  Negative for photophobia and visual disturbance.        Eye yellowing   Respiratory:  Negative for chest tightness, shortness of breath and wheezing.    Cardiovascular:  Negative for chest pain, palpitations and leg swelling.   Gastrointestinal:  Positive for nausea. Negative for abdominal pain, constipation, diarrhea and vomiting.   Genitourinary:  Positive for dysuria and urgency. Negative for frequency and hematuria. "        Dark urine   Musculoskeletal:  Negative for arthralgias, back pain and gait problem.   Skin:  Positive for color change and rash (psoriasis).   Neurological:  Positive for headaches. Negative for dizziness, syncope, weakness, light-headedness and numbness.   Psychiatric/Behavioral:  Negative for agitation and confusion. The patient is nervous/anxious. The patient is not hyperactive.      Objective:     Vital Signs (Most Recent):  Temp: 98.6 °F (37 °C) (10/14/24 1815)  Pulse: 84 (10/15/24 0300)  Resp: 16 (10/15/24 0300)  BP: (!) 111/59 (10/15/24 0300)  SpO2: 99 % (10/15/24 0300) Vital Signs (24h Range):  Temp:  [97.8 °F (36.6 °C)-98.6 °F (37 °C)] 98.6 °F (37 °C)  Pulse:  [] 84  Resp:  [14-20] 16  SpO2:  [98 %-100 %] 99 %  BP: (111-166)/() 111/59     Weight: 104.3 kg (230 lb)  Body mass index is 31.19 kg/m².     Physical Exam  Vitals and nursing note reviewed.   Constitutional:       General: He is not in acute distress.     Appearance: He is well-developed. He is obese.      Comments: Alert and oriented x4   HENT:      Head: Normocephalic and atraumatic.      Mouth/Throat:      Pharynx: No oropharyngeal exudate.   Eyes:      General: Scleral icterus present.      Conjunctiva/sclera: Conjunctivae normal.      Pupils: Pupils are equal, round, and reactive to light.   Cardiovascular:      Rate and Rhythm: Regular rhythm. Tachycardia present.      Heart sounds: Normal heart sounds. No murmur heard.  Pulmonary:      Effort: Pulmonary effort is normal. No respiratory distress.      Breath sounds: Normal breath sounds. No wheezing.   Abdominal:      General: Bowel sounds are normal. There is distension.      Palpations: Abdomen is soft.      Tenderness: There is no abdominal tenderness.      Comments: Non tender abdomen  Singh's negative   Musculoskeletal:         General: No tenderness. Normal range of motion.      Cervical back: Normal range of motion and neck supple.      Right lower leg: No edema.       Left lower leg: No edema.   Lymphadenopathy:      Cervical: No cervical adenopathy.   Skin:     General: Skin is warm and dry.      Capillary Refill: Capillary refill takes less than 2 seconds.      Coloration: Skin is jaundiced (mild).      Findings: Rash present.   Neurological:      Mental Status: He is alert and oriented to person, place, and time.      Cranial Nerves: No cranial nerve deficit.      Sensory: No sensory deficit.      Coordination: Coordination normal.      Comments: Mild tremor BUE  No asterixis   Psychiatric:         Behavior: Behavior normal.         Thought Content: Thought content normal.         Judgment: Judgment normal.      Comments: Appears anxious, endorses anxiety               CRANIAL NERVES     CN III, IV, VI   Pupils are equal, round, and reactive to light.       Significant Labs: All pertinent labs within the past 24 hours have been reviewed.  CBC:   Recent Labs   Lab 10/14/24  1840 10/14/24  2238   WBC 11.70  --    HGB 13.7*  --    HCT 37.3* 41     --      CMP:   Recent Labs   Lab 10/14/24  2337      K 3.7      CO2 23   GLU 85   BUN 6   CREATININE 0.8   CALCIUM 9.0   PROT 8.1   ALBUMIN 2.8*   BILITOT 15.8*   ALKPHOS 240*   *   ALT 27   ANIONGAP 14     Urine Studies:   Recent Labs   Lab 10/14/24  1620 10/15/24  0028   COLORU Jackie* Orange*   APPEARANCEUA  --  Hazy*   PHUR 6.5 6.0   SPECGRAV 1.025 >1.030*   PROTEINUA  --  1+*   GLUCUA  --  Negative   KETONESU 15* 1+*   BILIRUBINUA  --  3+*   OCCULTUA  --  1+*   NITRITE Negative Negative   UROBILINOGEN 1.0  --    LEUKOCYTESUR  --  Negative   RBCUA  --  4   WBCUA  --  2   BACTERIA  --  Rare   SQUAMEPITHEL  --  2   HYALINECASTS  --  0       Significant Imaging: I have reviewed all pertinent imaging results/findings within the past 24 hours.    Imaging Results               CT Abdomen Pelvis With IV Contrast NO Oral Contrast (Final result)  Result time 10/15/24 01:48:04      Final result by Francisco Champion  MD RIC (10/15/24 01:48:04)                   Impression:      Morphologic changes of chronic liver disease with diffuse heterogeneous attenuation of the liver parenchyma suggestive of underlying liver dysfunction.  Associated stigmata of portal hypertension including splenomegaly, upper abdominal and gastroesophageal varices, and small volume ascites.  Suggest correlation with risk factors and LFTs.  Multiphase MRI follow-up for hepatocellular carcinoma screening could be considered if clinically appropriate.    Gallbladder distension with mild inflammatory change and pericholecystic free fluid.  Findings could be seen in the setting of liver dysfunction though suggest correlation for any clinical signs of cholecystitis.    Additional findings discussed in the body of the report.    This report was flagged in Epic as abnormal.    Electronically signed by resident: Fer Ontiveros  Date:    10/15/2024  Time:    00:58    Electronically signed by: Francisco Champion MD  Date:    10/15/2024  Time:    01:48               Narrative:    EXAMINATION:  CT ABDOMEN PELVIS WITH IV CONTRAST    CLINICAL HISTORY:  Sepsis;    TECHNIQUE:  Axial images of the abdomen and pelvis were acquired  after the use of 100 cc Omnipaque 350 IV contrast. No oral contrast was administered.  Coronal and sagittal reconstructions were also obtained.    COMPARISON:  None.    FINDINGS:  LUNG BASES: Partially imaged heart and pericardium are within normal limits.  Lung bases are clear.    HEPATOBILIARY: Hepatomegaly measuring 25.2 cm craniocaudal length.  There is heterogeneous attenuation of the liver suggesting underlying liver dysfunction.  There is also minimal irregular contour of the liver with lobar redistribution, fissural widening, and caudate lobe hypertrophy.  No focal liver lesions on this single phase study.  Gallbladder is distended with wall hyperenhancement, pericholecystic fluid and inflammatory change.  No calcified gallstones identified.   CBD is normal caliber.  No intrahepatic bile duct dilatation.    SPLEEN: Splenomegaly measuring 14.9 cm craniocaudal.  No focal lesions.    PANCREAS: No peripancreatic inflammatory change, though evaluation is limited by the presence of free fluid and mesenteric edema.  No mass or duct dilatation.    ADRENALS: No adrenal nodules.    KIDNEYS/URETERS: Kidneys enhance symmetrically.  No stones or hydronephrosis.  No solid mass lesions.    BLADDER/PELVIC ORGANS: No bladder wall thickening.    PERITONEUM / RETROPERITONEUM: Small volume abdominopelvic free fluid.  No free air.  No organized fluid collections.    LYMPH NODES: No lymphadenopathy.    VESSELS: Mild atherosclerosis.  No aortic aneurysm.  Portal veins are patent.  Paraesophageal and periumbilical collateral vessels.  Recanalized umbilical vein.  No portal venous gas.    GI TRACT: Minimal wall prominence of the lower esophagus.  Associated esophageal varices.  No bowel distention or wall thickening.  Scattered colonic diverticula.  Normal appendix.    SOFT TISSUES: Unremarkable.    BONES: No fractures or focal osseous lesions.  Bilateral pars defects at L5.  Minimal anterolisthesis of L5 on S1.  Bifid spinous process at L5.                                      Assessment/Plan:     * Decompensated cirrhosis  Tachycardia   Alcoholic liver cirrhosis  Ascites    Patient with known Cirrhosis with Child's class B. Co-morbidities are present and inclusive of ascites, portal hypertension, esophageal varices, anemia/pancytopenia, and anticoagulation.  MELD-Na score calculated; MELD 3.0: 24 at 10/14/2024 11:37 PM  MELD-Na: 21 at 10/14/2024 11:37 PM  Calculated from:  Serum Creatinine: 0.8 mg/dL (Using min of 1 mg/dL) at 10/14/2024 11:37 PM  Serum Sodium: 139 mmol/L (Using max of 137 mmol/L) at 10/14/2024 11:37 PM  Total Bilirubin: 15.8 mg/dL at 10/14/2024 11:37 PM  Serum Albumin: 2.8 g/dL at 10/14/2024 11:37 PM  INR(ratio): 1.5 at 10/14/2024  6:40 PM  Age at listing  (hypothetical): 34 years  Sex: Male at 10/14/2024 11:37 PM      Continue chronic meds. Etiology likely ETOH. Will avoid any hepatotoxic meds, and monitor CBC/CMP/INR for synthetic function.     Patient presents with hyperbilirubinemia, bilirubin in urine, and jaundice.   8 years heavy alcohol abuse   - tachy to 133 on arrival, improved with 1L LR; hold off on further IVF for now given ascites   - CT abd pelvis with evidence of cirrhosis, mild ascites, portal HTN, varices   - PT 15.9/ INR 1.5   - Alcohol <10 (last drink 5 days ago)  - check U tox, acetaminophen level, ammonia, lipid panel   - neg hep c and HIV   - check liver doppler US   - hepatology consult   - limit tylenol, NSAIDS   - start thiamine, MV, folate   - CIWA monitoring; prn valium for CIWA >8  - addiction psych consulted for education/ assistance      VTE Risk Mitigation (From admission, onward)           Ordered     Reason for No Pharmacological VTE Prophylaxis  Once        Question:  Reasons:  Answer:  Patient is Ambulatory    10/15/24 0303     IP VTE HIGH RISK PATIENT  Once         10/15/24 0303     Place sequential compression device  Until discontinued         10/15/24 0303                         On 10/15/2024, patient should be placed in hospital observation services under my care in collaboration with Dr. Jose Garza.           ISAIAH CasianoC  Department of Hospital Medicine  [facility] - Telemetry Stepdown

## 2024-10-15 NOTE — DISCHARGE INSTRUCTIONS
REFERRAL RECOMMENDATIONS FOR ALCOHOL USE DISORDER      12 STEP PROGRAMS (and similar):     Alcoholics Anonymous (local)  [x] [phone]  [x] www.[place].org for schedules for in-person and online meetings  [x] There are AA meetings throughout the day all over town  [x] AA costs nothing to attend; they pass a basket for donations but this is not required    Alcoholics Anonymous Online Intergroup (national)  [x] www.aa-intergroup.org  [x] Good resource for large, nation-wide meetings  [x] Can also attend smaller, local meetings in other cities  [x] Countless meetings all day and all night  [x] AA costs nothing to attend; they pass a basket for donations but this is not required    Flying Sober - 24/7 zoom meetings for women and coed - sign on anytime, anywhere!  [email or web address] [email or web address]    Online Intergroup of AA - 121 Open AA Gladwin Meeting - 24/7 zoom meetings  https://aa-intergroup.org/meetings/    LOOKING FOR AN ALTERNATIVE TO 12 STEP PROGRAMS - check out:  SMART Recovery: https://www.smartrecovery.org/about-us  [facility]: https://recoverydharma.org      DETOX UNITS (USUALLY 5-7 DAYS):     [facility]: [address], [place]  [phone], call first to ensure bed availability    [facility]: [address] [place]  [phone], Option 1, call first to ensure bed availability    [place] [facility] Recovery Center: [facility] [address], [place]  [phone] (intake by appointment only)    [facility]: [address], [place]  [phone]      INTENSIVE OUTPATIENT PROGRAMS:     [facility] RECOVERY PROGRAM (formerly known as the ABU)  [x] [phone], Option 2  [x] [address] 4th Floor, [place] [address]  [x] https://www.ochsner.org/services/[facility]-recovery-program  [x] [facility] delivers comprehensive and collaborative treatment for alcohol and substance use disorders.  Excellent program for working professionals or  anyone else seeking recovery.  [x] Requires insurance approval prior to starting program, call number above for more information.  [x] Intensive Outpatient Rehabilitation Program - M-F 9am-3pm - daily groups with psychologists and social workers, sessions with MDs 3x per week   [x] Ambulatory detox and dual diagnosis available    [facility] Intensive Outpatient Program  [x] [phone]  [x] [address] [facility] (the clinic not on [facility])  [x] Call number above for more info and to check insurance requirements    [facility]  [address] [place], [place] [address] [phone]    [facility]:  [address] [place], [place] [address]?, [phone]  [address] [facility] [place], [place] [address]?, [phone]    RESIDENTIAL REHABS (USUALLY 28 DAYS):     [facility]: [address], [phone]    [facility]: [address] , [facility]  [phone] (intake by appointment only)    [facility] (men only) [address] [facility], [phone]    [facility] (Female only) [address] [facility], [phone]    [facility]: [address], [facility], men's program [phone], women's program [phone]    [facility]: [address], [facility], [phone]    [facility]: [address] [place], [place], [phone]    [facility]: Men only, [phone], [address] [place] [facility]: [address], [place], [place], [phone]    [facility]: [facility] [place], [place],  [phone]  New Location: [address] [place] [address] 100, [place] [address], [phone]    [facility]:   ?[address]?[place], [place] [address]?[phone]    [place]: [address], [place] [address], [phone]    [place]: [place], [phone]     [facility]: [place], [place], [phone]    [address]: [address]  [place] [place], [phone]    [facility]: [place], [place], [phone]    [facility]: [place], [place], [phone]    [facility] [place]: [address], [place] [address], [phone]    COMMUNITY ADDICTION CLINICS:     [facility]: [address] [facility], [address] [place], [phone] -or- [address] [place] [address]    [facility] [place]: [address] [facility], [place]  [address]     [facility]: Clinics [phone]; Crisis [phone]    [facility]: [address] [place] [address]    [facility]: [address] [place] [address]    [facility]: [address] [place] [address],    [facility]: 2nd Floor [address] [facility] [place] [address]    [facility]: [address] [place] [address]    [facility], [address] [place] [address]    [facility]: [address] [place] [address], [place] [phone]  (serves youth 16-23 years old)    [facility]: [facility]/[place] [phone]    [facility]: [address] [facility], [place] [phone]    [facility]: [address], [place] [phone]    [facility]: [address] [facility], [address], [phone]     [facility]: [address] [facility], [phone], [phone]    RESOURCES IN OTHER [facility]:     [facility]: [address] [place], [phone], [phone]    [facility]: [address], [address], [phone]    [facility], [address] [place] [address], [phone]    [facility]: [address], [place], [phone]    [facility]  "[facility], [address] [facility], [phone] ([facility])    [facility], [address] [facility], [phone] ([facility])    [facility], [address], [place], [phone] ([place], [place], [facility])    [facility], [address], [place], [phone] ([facility])    [facility] Hotline [phone], [phone]    [facility], [address] [facility], [facility], [address] [facility], [address], [facility], [address] [facility], [facility] Behavioral [facility], [address] [facility] [address] [place] [facility], [address], [facility], [address], [address] [phone]    [facility], [address] [facility] [phone]    [facility] Recovery, [address] [facility], [phone]    [facility]. for Addiction Recovery, [address] [place] [address], [phone]      [place]ay[place] Islander SPEAKING (en español):     Información de la reunión de Alcohólicos Anónimos  [facility], 10:00 am  Habla español  Esta reunión está abierta y cualquiera puede asistir.    Welsh speaking Alcoholics anonymous meetings:  El "Oracio Evans AA Skype" es un oracio on line de Alcohólicos Anónimos en español. El oracio es melissa, gratuito y virtual a través de Skype Audio. El oracio funciona mediante stephie llamada grupal de voz, por lo que no se utiliza la videollamada, ni se pueden angella las imágenes o rostros de los participantes. Hace shauna años y medio abrimos el primer Oracio de AA por Skanniee en [place], ramana actualmente asisten personas desde [place], [place], [place], [place], [place], [place], [place], [place], [place], [place], " [place] y [place], entre otros.    El valeri es muy útil para los alcohólicos que residen en lugares donde no se celebran reuniones de AA, o residen en lugares donde las reuniones de AA son un número limitado de días a la semana, o para aquellos compañeros que se hayan de viaje o que, por cualquier motivo, se hayan convalecientes y no pueden desplazarse. Todos los días nos reuniones a las 21:00 (hora española)    Podéis obtener más información sobre el valeri y priti sesiones en la págWirama web [email or web address]      MENTAL HEALTH/ADDICTIVE DISORDERS:     AA ([phone]), NA ([phone])   National Suicide Prevention Lifeline- Call 1-633.814.6802 Available 24 hours [facility] [phone]; Crisis Line [phone] - Call for options A-F:  Intensive Outpatient Treatment/ Day programs   [facility], please contact   [facility], please contact [phone] or [phone] to speak with an admissions counselor.  [facility]al Health Group (Methadone Maintenance)   [address] [place] [address], [phone]  [facility] [place] [address] [phone]  [facility], [address] [place] [address], [phone]  [facility] [phone]  [facility] [place] please contact [phone]  [facility], [facility], 4th floor [address] [place] [address] Phone: [phone]   Acer  [place] Office: [address] [address], [phone]  [place]ohannes Office: [address] [facility], [place] [address], [phone]  [place] Office: [address] [facility] [place] [address] [phone]    Outpatient Substance Abuse Treatment   Behavioral Health Group (Methadone Maintenance)   [facility] [place] [address], [phone]  [address] [address] [phone]  [facility], [address] [place] [address], [phone]  Acer  [place] Office: [address] [place] [place] [address], [phone]  [place] Office: [address] [facility], [address], [place] [address], [phone]  [place] Office: [address] [place] [address] [phone]  [facility], [address] [place] [address] [phone]   [facility], [address] [facility], [address], [phone]  [facility] for Addiction Recover, [address] [place], [place] [phone]    Residential Substance Abuse Treatment   [facility] [address] [facility], [phone] or [phone]  [facility], [address] [facility], [phone]  [facility] (men only) [address] [place] [address], [phone]  [facility] (women only) [address] [place] [address] [phone]  [facility], [address] [place] [address] [phone]  [facility] (women only), intakes at [address] [facility], [phone]  [facility] (7-day program, $100, [address], [phone], [phone], [phone])  [facility] (Men only, [phone]), [address], Ahmet (Vets*/Non-Vets)  [facility] (Men only, $400/month program fee) [address] [facility] [place], [phone]  [facility] (Women over age 39 only), [address] [facility], [place], [phone]    Out of Area:    [place], [address] [place] [address] 36, [place], [place] [phone])  [facility] (men only), [address] [place] [address], [phone]  [facility], [address] [place], [place] ([phone])  [address] [place], [place] ([phone])  [facility], [address] [place] [address], [phone]  [facility] (Women only, has to have mental illness, can be homeless or substance abuser), [phone]      [place] RESOURCES:     [place] [facility] Crisis Response  Team:    Region 12 ([place], [place], [place], and [facility]) ([facility])  [phone]      Outpatient Mental Health & Addiction Clinic Resources for both [facility]:    [facility] Mental Healthcare Resources  Website: www.UC Healthr.org  Main Number: 182-314-1480    [facility] ([facility])  [address] ([address] [facility]) [place] [address]  [phone]    [facility] ([facility])  [address] ([address] [facility]) [place] [address]  [phone]    [facility]  [address] ([address]) [place] [address]  [phone]    [facility]  [address] ([address] [facility] [place] [address]  [phone]      Addiction Treatment Resources for both [facility]:    [facility] (Detox, Residential, PHP, IOP, and Aftercare Programs)  [address] [place], [place] [address] [phone]    [facility] (Residential, IOP, Transitional Living, and Aftercare Programs)  [address] [place] [address]  [phone]    [facility] (Inpatient, Residential, Detox, IOP, Outpatient, and Aftercare Programs)  [address] [place] [id number] [phone] or toll free at [phone]      Outpatient Mental Health Psychotherapy Resources for both [facility]:    Alesha Alvarado, NADIRAW  [address]  [place] [address]  [phone]  Specialties: Depression, Anxiety, and Life Transitions    Dayami Blackman, PhD  [address]  [place] [address]  [phone]  Specialties: Testing and Evaluation, Education and Learning Disabilities, and ADHD    Nahed Sahu, NADIRAW [facility] [address] [place] [address]  [phone]  Specialties:  Obsessive-Compulsive (OCD), Depression, and Relationship Issues    Fanny Barnes LPC [address] [place] [address]  [phone]  Specialties: Trauma & PTSD, Mood Disorders, and Anxiety    Fanny Wright, PhD, [facility] [address] [place] [address], [place] [id number] [phone]  Specialties: Family Conflict, Child, and Relationship Issues    Charito Gandhi LPC Counseling [place], [place] [address] [phone]  Specialties: Anxiety, Depression, and Anger Management        IN CASE OF SUICIDAL THINKING, call the National Suicide Hotline Number: 988    988 Suicide & Crisis Lifeline: 988 , 7-0963-700-250-TALK (8970)  Provides 24/7, free and confidential support for people in distress, prevention and crisis resources for you or your loved ones, and best practices for professionals.    Call, text or chat.  https://988beenz.comline.org

## 2024-10-15 NOTE — CARE UPDATE
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       Lois Muñoz PA-C  Unit Based YAZAN

## 2024-10-15 NOTE — ASSESSMENT & PLAN NOTE
Tachycardia   Alcoholic liver cirrhosis  Ascites    Patient with known Cirrhosis with Child's class B. Co-morbidities are present and inclusive of ascites, portal hypertension, esophageal varices, anemia/pancytopenia, and anticoagulation.  MELD-Na score calculated; MELD 3.0: 24 at 10/14/2024 11:37 PM  MELD-Na: 21 at 10/14/2024 11:37 PM  Calculated from:  Serum Creatinine: 0.8 mg/dL (Using min of 1 mg/dL) at 10/14/2024 11:37 PM  Serum Sodium: 139 mmol/L (Using max of 137 mmol/L) at 10/14/2024 11:37 PM  Total Bilirubin: 15.8 mg/dL at 10/14/2024 11:37 PM  Serum Albumin: 2.8 g/dL at 10/14/2024 11:37 PM  INR(ratio): 1.5 at 10/14/2024  6:40 PM  Age at listing (hypothetical): 34 years  Sex: Male at 10/14/2024 11:37 PM      Continue chronic meds. Etiology likely ETOH. Will avoid any hepatotoxic meds, and monitor CBC/CMP/INR for synthetic function.     Patient presents with hyperbilirubinemia, bilirubin in urine, and jaundice.   8 years heavy alcohol abuse   - tachy to 133 on arrival, improved with 1L LR; hold off on further IVF for now given ascites   - CT abd pelvis with evidence of cirrhosis, mild ascites, portal HTN, varices   - PT 15.9/ INR 1.5   - Alcohol <10 (last drink 5 days ago)  - check U tox, acetaminophen level, ammonia, lipid panel   - neg hep c and HIV   - check liver doppler US   - hepatology consult   - limit tylenol, NSAIDS   - start thiamine, MV, folate   - CIWA monitoring; prn valium for CIWA >8  - addiction psych consulted for education/ assistance

## 2024-10-15 NOTE — SUBJECTIVE & OBJECTIVE
Past Medical History:   Diagnosis Date    ADD (attention deficit disorder)     Psoriasis        History reviewed. No pertinent surgical history.    Review of patient's allergies indicates:  No Known Allergies    No current facility-administered medications on file prior to encounter.     Current Outpatient Medications on File Prior to Encounter   Medication Sig    dextroamphetamine-amphetamine (AMPHETAMINE SALT COMBO) 20 mg tablet Take 1 tablet (20 mg total) by mouth 2 (two) times daily as needed. (Patient not taking: Reported on 10/14/2024)     Family History    None       Tobacco Use    Smoking status: Every Day     Types: Vaping with nicotine     Passive exposure: Current    Smokeless tobacco: Never   Substance and Sexual Activity    Alcohol use: Yes     Comment: Occ    Drug use: Never    Sexual activity: Not on file     Review of Systems   Constitutional:  Positive for chills, diaphoresis (mild sweats) and fever (subjective). Negative for activity change.   HENT:  Negative for trouble swallowing.    Eyes:  Negative for photophobia and visual disturbance.        Eye yellowing   Respiratory:  Negative for chest tightness, shortness of breath and wheezing.    Cardiovascular:  Negative for chest pain, palpitations and leg swelling.   Gastrointestinal:  Positive for nausea. Negative for abdominal pain, constipation, diarrhea and vomiting.   Genitourinary:  Positive for dysuria and urgency. Negative for frequency and hematuria.        Dark urine   Musculoskeletal:  Negative for arthralgias, back pain and gait problem.   Skin:  Positive for color change and rash (psoriasis).   Neurological:  Positive for headaches. Negative for dizziness, syncope, weakness, light-headedness and numbness.   Psychiatric/Behavioral:  Negative for agitation and confusion. The patient is nervous/anxious. The patient is not hyperactive.      Objective:     Vital Signs (Most Recent):  Temp: 98.6 °F (37 °C) (10/14/24 1815)  Pulse: 84 (10/15/24  0300)  Resp: 16 (10/15/24 0300)  BP: (!) 111/59 (10/15/24 0300)  SpO2: 99 % (10/15/24 0300) Vital Signs (24h Range):  Temp:  [97.8 °F (36.6 °C)-98.6 °F (37 °C)] 98.6 °F (37 °C)  Pulse:  [] 84  Resp:  [14-20] 16  SpO2:  [98 %-100 %] 99 %  BP: (111-166)/() 111/59     Weight: 104.3 kg (230 lb)  Body mass index is 31.19 kg/m².     Physical Exam  Vitals and nursing note reviewed.   Constitutional:       General: He is not in acute distress.     Appearance: He is well-developed. He is obese.      Comments: Alert and oriented x4   HENT:      Head: Normocephalic and atraumatic.      Mouth/Throat:      Pharynx: No oropharyngeal exudate.   Eyes:      General: Scleral icterus present.      Conjunctiva/sclera: Conjunctivae normal.      Pupils: Pupils are equal, round, and reactive to light.   Cardiovascular:      Rate and Rhythm: Regular rhythm. Tachycardia present.      Heart sounds: Normal heart sounds. No murmur heard.  Pulmonary:      Effort: Pulmonary effort is normal. No respiratory distress.      Breath sounds: Normal breath sounds. No wheezing.   Abdominal:      General: Bowel sounds are normal. There is distension.      Palpations: Abdomen is soft.      Tenderness: There is no abdominal tenderness.      Comments: Non tender abdomen  Singh's negative   Musculoskeletal:         General: No tenderness. Normal range of motion.      Cervical back: Normal range of motion and neck supple.      Right lower leg: No edema.      Left lower leg: No edema.   Lymphadenopathy:      Cervical: No cervical adenopathy.   Skin:     General: Skin is warm and dry.      Capillary Refill: Capillary refill takes less than 2 seconds.      Coloration: Skin is jaundiced (mild).      Findings: Rash present.   Neurological:      Mental Status: He is alert and oriented to person, place, and time.      Cranial Nerves: No cranial nerve deficit.      Sensory: No sensory deficit.      Coordination: Coordination normal.      Comments: Mild  tremor BUE  No asterixis   Psychiatric:         Behavior: Behavior normal.         Thought Content: Thought content normal.         Judgment: Judgment normal.      Comments: Appears anxious, endorses anxiety               CRANIAL NERVES     CN III, IV, VI   Pupils are equal, round, and reactive to light.       Significant Labs: All pertinent labs within the past 24 hours have been reviewed.  CBC:   Recent Labs   Lab 10/14/24  1840 10/14/24  2238   WBC 11.70  --    HGB 13.7*  --    HCT 37.3* 41     --      CMP:   Recent Labs   Lab 10/14/24  2337      K 3.7      CO2 23   GLU 85   BUN 6   CREATININE 0.8   CALCIUM 9.0   PROT 8.1   ALBUMIN 2.8*   BILITOT 15.8*   ALKPHOS 240*   *   ALT 27   ANIONGAP 14     Urine Studies:   Recent Labs   Lab 10/14/24  1620 10/15/24  0028   COLORU Jackie* Orange*   APPEARANCEUA  --  Hazy*   PHUR 6.5 6.0   SPECGRAV 1.025 >1.030*   PROTEINUA  --  1+*   GLUCUA  --  Negative   KETONESU 15* 1+*   BILIRUBINUA  --  3+*   OCCULTUA  --  1+*   NITRITE Negative Negative   UROBILINOGEN 1.0  --    LEUKOCYTESUR  --  Negative   RBCUA  --  4   WBCUA  --  2   BACTERIA  --  Rare   SQUAMEPITHEL  --  2   HYALINECASTS  --  0       Significant Imaging: I have reviewed all pertinent imaging results/findings within the past 24 hours.    Imaging Results               CT Abdomen Pelvis With IV Contrast NO Oral Contrast (Final result)  Result time 10/15/24 01:48:04      Final result by Francisco Champion MD (10/15/24 01:48:04)                   Impression:      Morphologic changes of chronic liver disease with diffuse heterogeneous attenuation of the liver parenchyma suggestive of underlying liver dysfunction.  Associated stigmata of portal hypertension including splenomegaly, upper abdominal and gastroesophageal varices, and small volume ascites.  Suggest correlation with risk factors and LFTs.  Multiphase MRI follow-up for hepatocellular carcinoma screening could be considered if  clinically appropriate.    Gallbladder distension with mild inflammatory change and pericholecystic free fluid.  Findings could be seen in the setting of liver dysfunction though suggest correlation for any clinical signs of cholecystitis.    Additional findings discussed in the body of the report.    This report was flagged in Epic as abnormal.    Electronically signed by resident: Fer Ontiveros  Date:    10/15/2024  Time:    00:58    Electronically signed by: Francisco Champion MD  Date:    10/15/2024  Time:    01:48               Narrative:    EXAMINATION:  CT ABDOMEN PELVIS WITH IV CONTRAST    CLINICAL HISTORY:  Sepsis;    TECHNIQUE:  Axial images of the abdomen and pelvis were acquired  after the use of 100 cc Omnipaque 350 IV contrast. No oral contrast was administered.  Coronal and sagittal reconstructions were also obtained.    COMPARISON:  None.    FINDINGS:  LUNG BASES: Partially imaged heart and pericardium are within normal limits.  Lung bases are clear.    HEPATOBILIARY: Hepatomegaly measuring 25.2 cm craniocaudal length.  There is heterogeneous attenuation of the liver suggesting underlying liver dysfunction.  There is also minimal irregular contour of the liver with lobar redistribution, fissural widening, and caudate lobe hypertrophy.  No focal liver lesions on this single phase study.  Gallbladder is distended with wall hyperenhancement, pericholecystic fluid and inflammatory change.  No calcified gallstones identified.  CBD is normal caliber.  No intrahepatic bile duct dilatation.    SPLEEN: Splenomegaly measuring 14.9 cm craniocaudal.  No focal lesions.    PANCREAS: No peripancreatic inflammatory change, though evaluation is limited by the presence of free fluid and mesenteric edema.  No mass or duct dilatation.    ADRENALS: No adrenal nodules.    KIDNEYS/URETERS: Kidneys enhance symmetrically.  No stones or hydronephrosis.  No solid mass lesions.    BLADDER/PELVIC ORGANS: No bladder wall  thickening.    PERITONEUM / RETROPERITONEUM: Small volume abdominopelvic free fluid.  No free air.  No organized fluid collections.    LYMPH NODES: No lymphadenopathy.    VESSELS: Mild atherosclerosis.  No aortic aneurysm.  Portal veins are patent.  Paraesophageal and periumbilical collateral vessels.  Recanalized umbilical vein.  No portal venous gas.    GI TRACT: Minimal wall prominence of the lower esophagus.  Associated esophageal varices.  No bowel distention or wall thickening.  Scattered colonic diverticula.  Normal appendix.    SOFT TISSUES: Unremarkable.    BONES: No fractures or focal osseous lesions.  Bilateral pars defects at L5.  Minimal anterolisthesis of L5 on S1.  Bifid spinous process at L5.

## 2024-10-16 PROBLEM — I81 PORTAL VEIN OBSTRUCTION: Status: ACTIVE | Noted: 2024-10-16

## 2024-10-16 PROBLEM — F10.90 ALCOHOL USE DISORDER: Status: ACTIVE | Noted: 2024-10-16

## 2024-10-16 PROBLEM — E80.6 HYPERBILIRUBINEMIA: Status: ACTIVE | Noted: 2024-10-16

## 2024-10-16 PROBLEM — E88.09 HYPOALBUMINEMIA: Status: ACTIVE | Noted: 2024-10-16

## 2024-10-16 PROBLEM — L40.9 PSORIASIS: Status: ACTIVE | Noted: 2024-10-16

## 2024-10-16 LAB
ALBUMIN SERPL BCP-MCNC: 2.5 G/DL (ref 3.5–5.2)
ALP SERPL-CCNC: 218 U/L (ref 55–135)
ALT SERPL W/O P-5'-P-CCNC: 25 U/L (ref 10–44)
ANA SER QL IF: NORMAL
ANION GAP SERPL CALC-SCNC: 9 MMOL/L (ref 8–16)
AST SERPL-CCNC: 126 U/L (ref 10–40)
BASOPHILS # BLD AUTO: 0.04 K/UL (ref 0–0.2)
BASOPHILS NFR BLD: 0.4 % (ref 0–1.9)
BILIRUB SERPL-MCNC: 17 MG/DL (ref 0.1–1)
BUN SERPL-MCNC: 6 MG/DL (ref 6–20)
CALCIUM SERPL-MCNC: 8.7 MG/DL (ref 8.7–10.5)
CHLORIDE SERPL-SCNC: 105 MMOL/L (ref 95–110)
CHOLEST SERPL-MCNC: 133 MG/DL (ref 120–199)
CHOLEST/HDLC SERPL: 22.2 {RATIO} (ref 2–5)
CO2 SERPL-SCNC: 22 MMOL/L (ref 23–29)
CREAT SERPL-MCNC: 0.7 MG/DL (ref 0.5–1.4)
DIFFERENTIAL METHOD BLD: ABNORMAL
EOSINOPHIL # BLD AUTO: 0 K/UL (ref 0–0.5)
EOSINOPHIL NFR BLD: 0.3 % (ref 0–8)
EPSTEIN-BARR VIRUS IGG, EARLY ANTIGEN: NEGATIVE
ERYTHROCYTE [DISTWIDTH] IN BLOOD BY AUTOMATED COUNT: 18.4 % (ref 11.5–14.5)
EST. GFR  (NO RACE VARIABLE): >60 ML/MIN/1.73 M^2
GLUCOSE SERPL-MCNC: 99 MG/DL (ref 70–110)
HCT VFR BLD AUTO: 33.6 % (ref 40–54)
HDLC SERPL-MCNC: 6 MG/DL (ref 40–75)
HDLC SERPL: 4.5 % (ref 20–50)
HGB BLD-MCNC: 12.2 G/DL (ref 14–18)
IMM GRANULOCYTES # BLD AUTO: 0.05 K/UL (ref 0–0.04)
IMM GRANULOCYTES NFR BLD AUTO: 0.4 % (ref 0–0.5)
INR PPP: 1.5 (ref 0.8–1.2)
LDLC SERPL CALC-MCNC: 91.6 MG/DL (ref 63–159)
LYMPHOCYTES # BLD AUTO: 1.7 K/UL (ref 1–4.8)
LYMPHOCYTES NFR BLD: 14.7 % (ref 18–48)
MAGNESIUM SERPL-MCNC: 2 MG/DL (ref 1.6–2.6)
MCH RBC QN AUTO: 35.8 PG (ref 27–31)
MCHC RBC AUTO-ENTMCNC: 36.3 G/DL (ref 32–36)
MCV RBC AUTO: 99 FL (ref 82–98)
MONOCYTES # BLD AUTO: 1.5 K/UL (ref 0.3–1)
MONOCYTES NFR BLD: 13.5 % (ref 4–15)
NEUTROPHILS # BLD AUTO: 7.9 K/UL (ref 1.8–7.7)
NEUTROPHILS NFR BLD: 70.7 % (ref 38–73)
NONHDLC SERPL-MCNC: 127 MG/DL
NRBC BLD-RTO: 0 /100 WBC
PHOSPHATE SERPL-MCNC: 3.4 MG/DL (ref 2.7–4.5)
PLATELET # BLD AUTO: 176 K/UL (ref 150–450)
PMV BLD AUTO: 10.8 FL (ref 9.2–12.9)
POTASSIUM SERPL-SCNC: 3.9 MMOL/L (ref 3.5–5.1)
PROT SERPL-MCNC: 7.4 G/DL (ref 6–8.4)
PROTHROMBIN TIME: 16.1 SEC (ref 9–12.5)
RBC # BLD AUTO: 3.41 M/UL (ref 4.6–6.2)
SODIUM SERPL-SCNC: 136 MMOL/L (ref 136–145)
TRIGL SERPL-MCNC: 177 MG/DL (ref 30–150)
WBC # BLD AUTO: 11.21 K/UL (ref 3.9–12.7)

## 2024-10-16 PROCEDURE — 63600175 PHARM REV CODE 636 W HCPCS: Performed by: STUDENT IN AN ORGANIZED HEALTH CARE EDUCATION/TRAINING PROGRAM

## 2024-10-16 PROCEDURE — 85610 PROTHROMBIN TIME: CPT

## 2024-10-16 PROCEDURE — 83735 ASSAY OF MAGNESIUM: CPT | Performed by: STUDENT IN AN ORGANIZED HEALTH CARE EDUCATION/TRAINING PROGRAM

## 2024-10-16 PROCEDURE — 36415 COLL VENOUS BLD VENIPUNCTURE: CPT

## 2024-10-16 PROCEDURE — 20600001 HC STEP DOWN PRIVATE ROOM

## 2024-10-16 PROCEDURE — 85025 COMPLETE CBC W/AUTO DIFF WBC: CPT

## 2024-10-16 PROCEDURE — 80061 LIPID PANEL: CPT

## 2024-10-16 PROCEDURE — 25000003 PHARM REV CODE 250: Performed by: STUDENT IN AN ORGANIZED HEALTH CARE EDUCATION/TRAINING PROGRAM

## 2024-10-16 PROCEDURE — 84100 ASSAY OF PHOSPHORUS: CPT | Performed by: STUDENT IN AN ORGANIZED HEALTH CARE EDUCATION/TRAINING PROGRAM

## 2024-10-16 PROCEDURE — 25000003 PHARM REV CODE 250

## 2024-10-16 PROCEDURE — 80053 COMPREHEN METABOLIC PANEL: CPT

## 2024-10-16 RX ORDER — FOLIC ACID 1 MG/1
1 TABLET ORAL DAILY
Status: DISCONTINUED | OUTPATIENT
Start: 2024-10-16 | End: 2024-10-17 | Stop reason: HOSPADM

## 2024-10-16 RX ORDER — CEFTRIAXONE 1 G/1
1 INJECTION, POWDER, FOR SOLUTION INTRAMUSCULAR; INTRAVENOUS
Status: DISCONTINUED | OUTPATIENT
Start: 2024-10-16 | End: 2024-10-17 | Stop reason: HOSPADM

## 2024-10-16 RX ADMIN — ALUMINUM HYDROXIDE, MAGNESIUM HYDROXIDE, AND SIMETHICONE 30 ML: 200; 200; 20 SUSPENSION ORAL at 05:10

## 2024-10-16 RX ADMIN — CEFTRIAXONE SODIUM 1 G: 1 INJECTION, POWDER, FOR SOLUTION INTRAMUSCULAR; INTRAVENOUS at 12:10

## 2024-10-16 RX ADMIN — FOLIC ACID 1 MG: 1 TABLET ORAL at 10:10

## 2024-10-16 RX ADMIN — Medication 100 MG: at 08:10

## 2024-10-16 RX ADMIN — ALUMINUM HYDROXIDE, MAGNESIUM HYDROXIDE, AND SIMETHICONE 30 ML: 200; 200; 20 SUSPENSION ORAL at 08:10

## 2024-10-16 RX ADMIN — LACTULOSE 15 G: 20 SOLUTION ORAL at 08:10

## 2024-10-16 RX ADMIN — THERA TABS 1 TABLET: TAB at 08:10

## 2024-10-16 NOTE — PLAN OF CARE
Case Management Assessment     PCP: None    Patient Arrived From: Home  Existing Help at Home: Self, Independent    Barriers to Discharge: Substance abuse    Discharge Plan:    A. Home w/family   B. Home    Patient AAOX3, independent at baseline. Will need to establish care with PCP on discharge. Spouse at bedside and will provide transportation home.   10/16/24 1415   Discharge Assessment   Assessment Type Discharge Planning Assessment   Confirmed/corrected address, phone number and insurance Yes   Confirmed Demographics Correct on Facesheet   Source of Information patient   Communicated GAMALIEL with patient/caregiver Date not available/Unable to determine   People in Home spouse   Do you expect to return to your current living situation? Yes   Do you have help at home or someone to help you manage your care at home? Yes   Who are your caregiver(s) and their phone number(s)? Luke Basilio (Spouse)  [phone] (Mobile)   Prior to hospitilization cognitive status: Alert/Oriented   Current cognitive status: Alert/Oriented   Walking or Climbing Stairs Difficulty no   Dressing/Bathing Difficulty no   Equipment Currently Used at Home none   Readmission within 30 days? No   Do you currently have service(s) that help you manage your care at home? No   Do you take prescription medications? Yes   Do you have prescription coverage? Yes   Do you have any problems affording any of your prescribed medications? No   Is the patient taking medications as prescribed? yes   Who is going to help you get home at discharge? Luke Basilio (Spouse)  [phone] (Mobile)   How do you get to doctors appointments? car, drives self   Are you on dialysis? No   Do you take coumadin? No   Discharge Plan A Home with family   Discharge Plan B Home   DME Needed Upon Discharge  none   Discharge Plan discussed with: Patient   Transition of Care Barriers Unisured     [facility] - Telemetry Stepdown  Initial Discharge Assessment       Primary Care Provider:  No primary care provider on file.    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]    Admission Date: 10/14/2024  Expected Discharge Date: 10/18/2024    Transition of Care Barriers: (P) Unisured    Payor: /     Extended Emergency Contact Information  Primary Emergency Contact: Luke Basilio  Mobile Phone: [phone]  Relation: Spouse    Discharge Plan A: (P) Home with family  Discharge Plan B: (P) Home      [id number] - [place], [place] - [address] [place] [address] [address] [facility] [place] [address]  Phone: [phone] Fax: [phone]    [facility] - [facility] [place] - [address]  [address]  [place] [address]  Phone: [phone] Fax: [phone]    [id number] - [place] - [address]  [address] [place] [address]  Phone: [phone] Fax: [phone]      Initial Assessment (most recent)       Adult Discharge Assessment - 10/16/24 1415          Discharge Assessment    Assessment Type Discharge Planning Assessment     Confirmed/corrected address, phone number and insurance Yes     Confirmed Demographics Correct on Facesheet     Source of Information patient     Communicated GAMALIEL with patient/caregiver Date not available/Unable to determine     People in Home spouse     Do you expect to return to your current living situation? Yes     Do you have help at home or someone to help you manage your care at home? Yes     Who are your caregiver(s) and their phone number(s)? Luke Basilio (Spouse)  [phone] (Mobile) (P)      Prior to hospitilization cognitive status: Alert/Oriented (P)      Current cognitive status: Alert/Oriented (P)      Walking or Climbing Stairs Difficulty no (P)      Dressing/Bathing Difficulty no (P)      Equipment Currently Used at Home none (P)      Readmission within 30 days? No (P)      Do you currently have service(s) that help you manage your care at home? No (P)       Do you take prescription medications? Yes (P)      Do you have prescription coverage? Yes (P)      Do you have any problems affording any of your prescribed medications? No (P)      Is the patient taking medications as prescribed? yes (P)      Who is going to help you get home at discharge? PrafulLuke (Spouse)  [phone] (Mobile) (P)      How do you get to doctors appointments? car, drives self (P)      Are you on dialysis? No (P)      Do you take coumadin? No (P)      Discharge Plan A Home with family (P)      Discharge Plan B Home (P)      DME Needed Upon Discharge  none (P)      Discharge Plan discussed with: Patient (P)      Transition of Care Barriers Unisured (P)

## 2024-10-16 NOTE — PLAN OF CARE
Problem: Adult Inpatient Plan of Care  Goal: Plan of Care Review  Outcome: Progressing  Goal: Patient-Specific Goal (Individualized)  Outcome: Progressing  Goal: Absence of Hospital-Acquired Illness or Injury  Outcome: Progressing  Goal: Optimal Comfort and Wellbeing  Outcome: Progressing  Goal: Readiness for Transition of Care  Outcome: Progressing     Problem: Infection  Goal: Absence of Infection Signs and Symptoms  Outcome: Progressing     Pt A&Ox4. VSS. Afebrile. Meds given per MAR. No adverse side effects noted. Pt ambulating to bathroom. Pt declining bed bath or shower. Pt not voicing any unmet needs or concerns at this time. Spouse at bedside. Bed locked and lowered. Call light within reach.

## 2024-10-16 NOTE — PLAN OF CARE
MOHIT screening: Self-Pay Screening  Denied on 10/15/2024  Patients: Fermin Moreno  Last comment: Per passport no [place] coverage found: [employer] Reference Number: [id number], room visit made, pt states he is single, unemployed, no minors, not claiming a disability, no criteria.

## 2024-10-17 VITALS
WEIGHT: 215.63 LBS | DIASTOLIC BLOOD PRESSURE: 72 MMHG | RESPIRATION RATE: 18 BRPM | BODY MASS INDEX: 29.21 KG/M2 | HEIGHT: 72 IN | OXYGEN SATURATION: 99 % | SYSTOLIC BLOOD PRESSURE: 148 MMHG | HEART RATE: 100 BPM | TEMPERATURE: 99 F

## 2024-10-17 LAB
ALBUMIN SERPL BCP-MCNC: 2.4 G/DL (ref 3.5–5.2)
ALP SERPL-CCNC: 196 U/L (ref 55–135)
ALT SERPL W/O P-5'-P-CCNC: 25 U/L (ref 10–44)
ANION GAP SERPL CALC-SCNC: 11 MMOL/L (ref 8–16)
AST SERPL-CCNC: 126 U/L (ref 10–40)
BASOPHILS # BLD AUTO: 0.05 K/UL (ref 0–0.2)
BASOPHILS NFR BLD: 0.5 % (ref 0–1.9)
BILIRUB SERPL-MCNC: 16.7 MG/DL (ref 0.1–1)
BUN SERPL-MCNC: 7 MG/DL (ref 6–20)
CALCIUM SERPL-MCNC: 8.4 MG/DL (ref 8.7–10.5)
CHLORIDE SERPL-SCNC: 105 MMOL/L (ref 95–110)
CO2 SERPL-SCNC: 21 MMOL/L (ref 23–29)
CREAT SERPL-MCNC: 0.8 MG/DL (ref 0.5–1.4)
DIFFERENTIAL METHOD BLD: ABNORMAL
EOSINOPHIL # BLD AUTO: 0 K/UL (ref 0–0.5)
EOSINOPHIL NFR BLD: 0.4 % (ref 0–8)
ERYTHROCYTE [DISTWIDTH] IN BLOOD BY AUTOMATED COUNT: 18.5 % (ref 11.5–14.5)
EST. GFR  (NO RACE VARIABLE): >60 ML/MIN/1.73 M^2
GLUCOSE SERPL-MCNC: 110 MG/DL (ref 70–110)
HCT VFR BLD AUTO: 31.3 % (ref 40–54)
HGB BLD-MCNC: 11.5 G/DL (ref 14–18)
HSV-1 DNA BY PCR: NEGATIVE
HSV-2 DNA BY PCR: NEGATIVE
IGG1 SER-MCNC: ABNORMAL MG/DL (ref 382–929)
IGG2 SER-MCNC: 312 MG/DL (ref 242–700)
IGG3 SER-MCNC: 45 MG/DL (ref 22–176)
IGG4 SER-MCNC: 266 MG/DL (ref 4–86)
IMM GRANULOCYTES # BLD AUTO: 0.03 K/UL (ref 0–0.04)
IMM GRANULOCYTES NFR BLD AUTO: 0.3 % (ref 0–0.5)
INR PPP: 1.6 (ref 0.8–1.2)
LYMPHOCYTES # BLD AUTO: 1.1 K/UL (ref 1–4.8)
LYMPHOCYTES NFR BLD: 12 % (ref 18–48)
MAGNESIUM SERPL-MCNC: 1.9 MG/DL (ref 1.6–2.6)
MCH RBC QN AUTO: 37 PG (ref 27–31)
MCHC RBC AUTO-ENTMCNC: 36.7 G/DL (ref 32–36)
MCV RBC AUTO: 101 FL (ref 82–98)
MITOCHONDRIA AB TITR SER IF: NORMAL {TITER}
MONOCYTES # BLD AUTO: 1.3 K/UL (ref 0.3–1)
MONOCYTES NFR BLD: 14.4 % (ref 4–15)
NEUTROPHILS # BLD AUTO: 6.7 K/UL (ref 1.8–7.7)
NEUTROPHILS NFR BLD: 72.4 % (ref 38–73)
NRBC BLD-RTO: 0 /100 WBC
PHOSPHATE SERPL-MCNC: 3.2 MG/DL (ref 2.7–4.5)
PLATELET # BLD AUTO: 171 K/UL (ref 150–450)
PMV BLD AUTO: 11.1 FL (ref 9.2–12.9)
POTASSIUM SERPL-SCNC: 3.8 MMOL/L (ref 3.5–5.1)
PROT SERPL-MCNC: 7 G/DL (ref 6–8.4)
PROTHROMBIN TIME: 16.6 SEC (ref 9–12.5)
RBC # BLD AUTO: 3.11 M/UL (ref 4.6–6.2)
SMOOTH MUSCLE AB TITR SER IF: NORMAL {TITER}
SODIUM SERPL-SCNC: 137 MMOL/L (ref 136–145)
WBC # BLD AUTO: 9.31 K/UL (ref 3.9–12.7)

## 2024-10-17 PROCEDURE — 36415 COLL VENOUS BLD VENIPUNCTURE: CPT

## 2024-10-17 PROCEDURE — 25000003 PHARM REV CODE 250: Performed by: STUDENT IN AN ORGANIZED HEALTH CARE EDUCATION/TRAINING PROGRAM

## 2024-10-17 PROCEDURE — 83735 ASSAY OF MAGNESIUM: CPT | Performed by: STUDENT IN AN ORGANIZED HEALTH CARE EDUCATION/TRAINING PROGRAM

## 2024-10-17 PROCEDURE — 25000003 PHARM REV CODE 250

## 2024-10-17 PROCEDURE — 85610 PROTHROMBIN TIME: CPT

## 2024-10-17 PROCEDURE — 85025 COMPLETE CBC W/AUTO DIFF WBC: CPT

## 2024-10-17 PROCEDURE — 63600175 PHARM REV CODE 636 W HCPCS: Performed by: STUDENT IN AN ORGANIZED HEALTH CARE EDUCATION/TRAINING PROGRAM

## 2024-10-17 PROCEDURE — 80053 COMPREHEN METABOLIC PANEL: CPT

## 2024-10-17 PROCEDURE — 84100 ASSAY OF PHOSPHORUS: CPT | Performed by: STUDENT IN AN ORGANIZED HEALTH CARE EDUCATION/TRAINING PROGRAM

## 2024-10-17 RX ORDER — CIPROFLOXACIN 500 MG/1
500 TABLET ORAL EVERY 12 HOURS
Qty: 4 TABLET | Refills: 0 | Status: SHIPPED | OUTPATIENT
Start: 2024-10-17 | End: 2024-10-19

## 2024-10-17 RX ORDER — HYDROCORTISONE 25 MG/G
CREAM TOPICAL 2 TIMES DAILY
Qty: 28 G | Refills: 0 | Status: SHIPPED | OUTPATIENT
Start: 2024-10-17

## 2024-10-17 RX ORDER — TRIAMCINOLONE ACETONIDE 1 MG/G
OINTMENT TOPICAL 2 TIMES DAILY
Qty: 454 G | Refills: 0 | Status: SHIPPED | OUTPATIENT
Start: 2024-10-17

## 2024-10-17 RX ORDER — IBUPROFEN 200 MG
1 TABLET ORAL DAILY
Qty: 28 PATCH | Refills: 11 | Status: SHIPPED | OUTPATIENT
Start: 2024-10-17 | End: 2025-10-17

## 2024-10-17 RX ORDER — FOLIC ACID 1 MG/1
1 TABLET ORAL DAILY
Qty: 30 TABLET | Refills: 11 | Status: SHIPPED | OUTPATIENT
Start: 2024-10-18 | End: 2025-10-18

## 2024-10-17 RX ORDER — LANOLIN ALCOHOL/MO/W.PET/CERES
100 CREAM (GRAM) TOPICAL DAILY
Qty: 30 TABLET | Refills: 11 | Status: SHIPPED | OUTPATIENT
Start: 2024-10-18 | End: 2025-10-18

## 2024-10-17 RX ADMIN — THERA TABS 1 TABLET: TAB at 08:10

## 2024-10-17 RX ADMIN — CEFTRIAXONE SODIUM 1 G: 1 INJECTION, POWDER, FOR SOLUTION INTRAMUSCULAR; INTRAVENOUS at 11:10

## 2024-10-17 RX ADMIN — FOLIC ACID 1 MG: 1 TABLET ORAL at 08:10

## 2024-10-17 RX ADMIN — Medication 100 MG: at 08:10

## 2024-10-17 NOTE — ASSESSMENT & PLAN NOTE
- PETH obtained   - start thiamine, MV, folate   - CIWA monitoring; prn valium for CIWA >8  - addiction psych saw patient. Appreciate recommendations.

## 2024-10-17 NOTE — ASSESSMENT & PLAN NOTE
Extensive psoriasis and has not been able to follow up outpatient due to lack of insurance however he has been using topical steroids at home.     discussed with dermatology, given no acuity and no signs of acute infection recommendation for outpatient follow up. Recommended topical steroid for psoriasis is triamcinolone 0.1% ointment for the body and hydrocortisone 2.5% ointment for the axillae/groin.

## 2024-10-17 NOTE — HOSPITAL COURSE
Patient admitted for decompensated cirrhosis.  Evaluated by hepatology appreciate recommendations.  Chronic liver disease serological labs were sent. Paracentesis attempted however no pocket for safe paracentesis.  Ammonia of greater than 100 however mentation intact.  Monitor MELD. Started on Rocephin for SBP ppx. Holding steroids for now to ensure no infection. Will consult CM to help him set up insurance with marketplace/medicaid     Addiction psychiatry evaluated the patient.  Appreciate recommendations.     Regarding extensive psoriasis, discussed with dermatology, given no acuity and no signs of acute infection recommendation for outpatient follow up. Recommended topical steroid for psoriasis is triamcinolone 0.1% ointment for the body and hydrocortisone 2.5% ointment for the axillae/groin.     Reports last drink 7 days ago. Not scoring on CIWA.  Per hepatology:  Unfortunately given lack of insurance, we would be unable to open a transplant evaluation. He is [place] resident and even if he were to apply for [place] medicaid, we still would be [place] to [place] [facility] Recommend  consult to determine what hospital/hepatologist he could follow up with as an outpatient.   - His liver enzymes are slighting improving.   - Counseled on alcohol cessation and will need to maintain sobriety with hopes that his liver function will improve.   - Infectious workup unremarkable.   Referred to [facility] to followup with derm and hepatology. Stable for discharge.

## 2024-10-17 NOTE — ASSESSMENT & PLAN NOTE
US Liver with doppler reported Cirrhotic morphology with sequela of portal hypertension including small volume ascites, splenomegaly, patent paraumbilical vein, collateral vessels, and reversed flow in the splenic vein, right and left portal veins.     Partially occlusive thrombus versus sluggish flow in the superior mesenteric, splenic, main portal, and right left portal veins.     CT abdomen however suggested patent vasculature    No intervention necessary at this time.

## 2024-10-17 NOTE — PLAN OF CARE
Problem: Adult Inpatient Plan of Care  Goal: Plan of Care Review  Outcome: Met  Goal: Patient-Specific Goal (Individualized)  Outcome: Met  Goal: Absence of Hospital-Acquired Illness or Injury  Outcome: Met  Goal: Optimal Comfort and Wellbeing  Outcome: Met  Goal: Readiness for Transition of Care  Outcome: Met     Problem: Infection  Goal: Absence of Infection Signs and Symptoms  Outcome: Met     Pt A&Ox4. VSS. Afebrile. Meds given per MAR. No adverse side effects noted. Pt received bedside medications. Pt ambulating off unit to vehicle. Spouse escorting pt. All discharge instructions reviewed with patient. Pt. Denying any questions, concerns or unmet needs.

## 2024-10-17 NOTE — PLAN OF CARE
Problem: Adult Inpatient Plan of Care  Goal: Absence of Hospital-Acquired Illness or Injury  Outcome: Progressing  Goal: Optimal Comfort and Wellbeing  Outcome: Progressing  Goal: Readiness for Transition of Care  Outcome: Progressing     Problem: Infection  Goal: Absence of Infection Signs and Symptoms  Outcome: Progressing

## 2024-10-17 NOTE — ASSESSMENT & PLAN NOTE
Likely 2/2 anxiety and decreased PO intake .   Improved after IV bolus on admission  CIWA has been < 6

## 2024-10-17 NOTE — PLAN OF CARE
[facility] - Telemetry Stepdown  Discharge Final Note    Primary Care Provider: Karen, Primary Doctor    Expected Discharge Date: 10/17/2024    Final Discharge Note (most recent)       Final Note - 10/17/24 1018          Final Note    Assessment Type Final Discharge Note     Anticipated Discharge Disposition Home or Self Care     Hospital Resources/Appts/Education Provided Appointments scheduled and added to AVS        Post-Acute Status    Post-Acute Authorization Other     Other Status No Post-Acute Service Needs     Discharge Delays Payor Issues                     Important Message from Medicare             Contact Info       No, Primary Doctor   Relationship: PCP - General        Next Steps: Schedule an appointment as soon as possible for a visit in 1 week(s)          Pt set to dc today, met with Pt and SO at bedside, has ride home. Pharmacist added to chat as Pt is self pay for bedside meds at dc, Sw will assist as/if needed. Pt updated.

## 2024-10-17 NOTE — PLAN OF CARE
CHW scheduled pt's hospital f/u visit on 10/24/24 @ 9:45 am.  Pt navigator sent a message to clinic nurse at Hepatology to contact pt to schedule his hospital f/u appointment.

## 2024-10-17 NOTE — ASSESSMENT & PLAN NOTE
Alcoholic liver cirrhosis    Patient with known Cirrhosis with Child's class B. Co-morbidities are present and inclusive of ascites, portal hypertension, esophageal varices, anemia/pancytopenia, and anticoagulation.  MELD-Na score calculated; MELD 3.0: 25 at 10/16/2024  5:27 AM  MELD-Na: 23 at 10/16/2024  5:27 AM  Calculated from:  Serum Creatinine: 0.7 mg/dL (Using min of 1 mg/dL) at 10/16/2024  5:27 AM  Serum Sodium: 136 mmol/L at 10/16/2024  5:27 AM  Total Bilirubin: 17 mg/dL at 10/16/2024  5:27 AM  Serum Albumin: 2.5 g/dL at 10/16/2024  5:27 AM  INR(ratio): 1.5 at 10/16/2024  5:27 AM  Age at listing (hypothetical): 34 years  Sex: Male at 10/16/2024  5:27 AM      Continue chronic meds. Etiology likely ETOH. Will avoid any hepatotoxic meds, and monitor CBC/CMP/INR for synthetic function.     Patient presents with hyperbilirubinemia, bilirubin in urine, and jaundice.   8 years heavy alcohol abuse   - tachy to 133 on arrival, improved with 1L LR   - CT abd pelvis with evidence of cirrhosis, mild ascites, portal HTN, varices   - Alcohol <10 (last drink 5 days ago)    - neg hep c and HIV, further serological workup obtained   - Appreciate hepatology recommendations.  - Defer steroids for now to ensure no infection.  - No safe pocket for paracentesis, Rocephin for SBP ppx  - MELD score of 25, will monitor for improvement for transition to outpatient followup vs inpatient evaluation.   - Will consult CM with assistance due to him being uninsured.

## 2024-10-17 NOTE — PROGRESS NOTES
"[facility] - Telemetry [facility]  Progress Note    Patient Name: Fermin Moreno  MRN: [id number]  Patient Class: IP- Inpatient   Admission Date: 10/14/2024  Length of Stay: 1 days  Attending Physician: Ena Stewart MD  Primary Care Provider: Karen, Primary Doctor        Subjective:     Principal Problem:Decompensated cirrhosis        HPI:  Fermin Moreno is a 34 male PMH of Psoriasis, alcohol abuse being admitted to  for hyperbilirubinemia and jaundice. Patient initially presented to ED with CC dark urine and mild dysuria x 1.5 weeks. Associated chills, sweats, tremors, anxiety, nausea, and yellowing of this skin/ eyes. Patient endorses about 8 years of heavy drinking. First, 3-4 years drinking ~1 pint vodka daily followed by 4 years of drinking ~12 beers daily. Last drink was 5 days ago. Denies documented fevers, confusion, hallucinations, LOC, seizures, lightheadedness, lethargy, abdominal swelling, leg swelling, SOB, chest pain, bowel changes, vomiting, flank pain, bleeding. Endorses mild intermittent LLQ pain, resolved at time of admission. Patient denies known hx of liver disease, heart disease, kidney disease. He does not follow with a PCP and has never seen a hepatologist.     In the ED: , /101, improved after 1 L LR bolus. Afebrile. WBC 11.7K. Hgb 13.7/ Hct 37.3, MCV 99. PT 15.9/ INR 1.5. . Albumin 2.8. T bili 15.8. Bili direct 11.3. / ALT 27. Alcohol <10. UA with 1+ protein, 1+ ketones, 1+ glucose, 3+ bilirubin; non-infectious. CT abd pelvis w contrast: "Morphologic changes of chronic liver disease with diffuse heterogeneous attenuation of the liver parenchyma suggestive of underlying liver dysfunction.  Associated stigmata of portal hypertension including splenomegaly, upper abdominal and gastroesophageal varices, and small volume ascites.  Suggest correlation with risk factors and LFTs.  Multiphase MRI follow-up for hepatocellular carcinoma screening could be considered " "if clinically appropriate. Gallbladder distension with mild inflammatory change and pericholecystic free fluid.  Findings could be seen in the setting of liver dysfunction though suggest correlation for any clinical signs of cholecystitis."     Overview/Hospital Course:  Patient admitted for decompensated cirrhosis.  Evaluated by hepatology appreciate recommendations.  Chronic liver disease serological labs were sent. Paracentesis attempted however no pocket for safe paracentesis.  Ammonia of greater than 100 however mentation intact.  Monitor MELD. Started on Rocephin for SBP ppx. Holding steroids for now to ensure no infection. Will consult CM to help him set up insurance with [employer]/medicaid     Addiction psychiatry evaluated the patient.  Appreciate recommendations.     Regarding extensive psoriasis, discussed with dermatology, given no acuity and no signs of acute infection recommendation for outpatient follow up. Recommended topical steroid for psoriasis is triamcinolone 0.1% ointment for the body and hydrocortisone 2.5% ointment for the axillae/groin.     Reports last drink 7 days ago. Not scoring on CIWA.      Interval History     Patient doing well. Does have some jitteriness however not scoring on CIWA and appears comfortable.     Review of Systems   Constitutional:  Negative for activity change, chills, diaphoresis (mild sweats) and fever (subjective).   HENT:  Negative for trouble swallowing.    Eyes:  Negative for visual disturbance.        Eye yellowing   Respiratory:  Negative for shortness of breath and wheezing.    Cardiovascular:  Negative for chest pain and leg swelling.   Gastrointestinal:  Negative for abdominal pain, constipation and nausea.   Genitourinary:  Negative for dysuria, frequency, hematuria and urgency.        Dark urine   Musculoskeletal:  Negative for arthralgias and back pain.   Skin:  Positive for color change and rash (psoriasis).   Neurological:  Negative for dizziness and " light-headedness.   Psychiatric/Behavioral:  Negative for confusion. The patient is not nervous/anxious.      Objective:     Vital Signs (Most Recent):  Temp: 98.8 °F (37.1 °C) (10/16/24 2000)  Pulse: 92 (10/16/24 2000)  Resp: 18 (10/16/24 2000)  BP: 134/82 (10/16/24 2000)  SpO2: 99 % (10/16/24 2000) Vital Signs (24h Range):  Temp:  [98.4 °F (36.9 °C)-98.8 °F (37.1 °C)] 98.8 °F (37.1 °C)  Pulse:  [] 92  Resp:  [18-20] 18  SpO2:  [95 %-100 %] 99 %  BP: (134-150)/(76-88) 134/82     Weight: 97.8 kg (215 lb 9.8 oz)  Body mass index is 29.24 kg/m².     Physical Exam  Vitals and nursing note reviewed.   Constitutional:       General: He is not in acute distress.     Appearance: He is well-developed. He is obese.      Comments: Alert and oriented x4   HENT:      Head: Normocephalic and atraumatic.   Eyes:      General: Scleral icterus present.   Cardiovascular:      Rate and Rhythm: Regular rhythm. Tachycardia present.      Heart sounds: Normal heart sounds. No murmur heard.  Pulmonary:      Effort: Pulmonary effort is normal. No respiratory distress.      Breath sounds: Normal breath sounds.   Abdominal:      General: Bowel sounds are normal. There is distension.      Palpations: Abdomen is soft.      Tenderness: There is no abdominal tenderness.      Comments: Non tender abdomen  Singh's negative   Musculoskeletal:         General: No tenderness. Normal range of motion.      Cervical back: Normal range of motion.      Right lower leg: No edema.      Left lower leg: No edema.   Skin:     General: Skin is warm and dry.      Capillary Refill: Capillary refill takes less than 2 seconds.      Coloration: Skin is jaundiced (mild).      Findings: Rash present.   Neurological:      Mental Status: He is alert and oriented to person, place, and time. Mental status is at baseline.      Comments: Mild tremor BUE  No asterixis   Psychiatric:         Mood and Affect: Mood normal.         Behavior: Behavior normal.       Comments: Appears anxious, endorses anxiety                 Significant Labs: All pertinent labs within the past 24 hours have been reviewed.  CBC:   Recent Labs   Lab 10/14/24  2238 10/15/24  0816 10/16/24  0527   WBC  --  10.03 11.21   HGB  --  11.1* 12.2*   HCT 41 30.1* 33.6*   PLT  --  153 176     CMP:   Recent Labs   Lab 10/14/24  2337 10/15/24  0816 10/16/24  0527    137 136   K 3.7 4.0 3.9    102 105   CO2 23 22* 22*   GLU 85 95 99   BUN 6 6 6   CREATININE 0.8 0.7 0.7   CALCIUM 9.0 8.8 8.7   PROT 8.1 7.0 7.4   ALBUMIN 2.8* 2.5* 2.5*   BILITOT 15.8* 14.8* 17.0*   ALKPHOS 240* 200* 218*   * 120* 126*   ALT 27 23 25   ANIONGAP 14 13 9       Significant Imaging: I have reviewed all pertinent imaging results/findings within the past 24 hours.    Assessment/Plan:      * Decompensated cirrhosis  Alcoholic liver cirrhosis    Patient with known Cirrhosis with Child's class B. Co-morbidities are present and inclusive of ascites, portal hypertension, esophageal varices, anemia/pancytopenia, and anticoagulation.  MELD-Na score calculated; MELD 3.0: 25 at 10/16/2024  5:27 AM  MELD-Na: 23 at 10/16/2024  5:27 AM  Calculated from:  Serum Creatinine: 0.7 mg/dL (Using min of 1 mg/dL) at 10/16/2024  5:27 AM  Serum Sodium: 136 mmol/L at 10/16/2024  5:27 AM  Total Bilirubin: 17 mg/dL at 10/16/2024  5:27 AM  Serum Albumin: 2.5 g/dL at 10/16/2024  5:27 AM  INR(ratio): 1.5 at 10/16/2024  5:27 AM  Age at listing (hypothetical): 34 years  Sex: Male at 10/16/2024  5:27 AM      Continue chronic meds. Etiology likely ETOH. Will avoid any hepatotoxic meds, and monitor CBC/CMP/INR for synthetic function.     Patient presents with hyperbilirubinemia, bilirubin in urine, and jaundice.   8 years heavy alcohol abuse   - tachy to 133 on arrival, improved with 1L LR   - CT abd pelvis with evidence of cirrhosis, mild ascites, portal HTN, varices   - Alcohol <10 (last drink 5 days ago)    - neg hep c and HIV, further serological  workup obtained   - Appreciate hepatology recommendations.  - Defer steroids for now to ensure no infection.  - No safe pocket for paracentesis, Rocephin for SBP ppx  - MELD score of 25, will monitor for improvement for transition to outpatient followup vs inpatient evaluation.   - Will consult CM with assistance due to him being uninsured.       Portal vein obstruction  US Liver with doppler reported Cirrhotic morphology with sequela of portal hypertension including small volume ascites, splenomegaly, patent paraumbilical vein, collateral vessels, and reversed flow in the splenic vein, right and left portal veins.     Partially occlusive thrombus versus sluggish flow in the superior mesenteric, splenic, main portal, and right left portal veins.     CT abdomen however suggested patent vasculature    No intervention necessary at this time.    Hypoalbuminemia  2/2 decompensated cirrhosis         Hyperbilirubinemia  2/2 decompensated cirrhosis      Psoriasis  Extensive psoriasis and has not been able to follow up outpatient due to lack of insurance however he has been using topical steroids at home.     discussed with dermatology, given no acuity and no signs of acute infection recommendation for outpatient follow up. Recommended topical steroid for psoriasis is triamcinolone 0.1% ointment for the body and hydrocortisone 2.5% ointment for the axillae/groin.    Alcohol use disorder  - PETH obtained   - start thiamine, MV, folate   - CIWA monitoring; prn valium for CIWA >8  - addiction psych saw patient. Appreciate recommendations.       Coagulopathy  2/2/ cirrhosis   Recent Labs   Lab 10/16/24  0527   INR 1.5*           Ascites due to alcoholic cirrhosis  US bedside with no safe pocket for paracentesis       Tachycardia  Likely 2/2 anxiety and decreased PO intake .   Improved after IV bolus on admission  CIWA has been < 6         Chronic liver disease due to alcohol  Plan as decompensated hepatic cirrhosis           VTE  Risk Mitigation (From admission, onward)           Ordered     Reason for No Pharmacological VTE Prophylaxis  Once        Question:  Reasons:  Answer:  Patient is Ambulatory    10/15/24 0303     IP VTE HIGH RISK PATIENT  Once         10/15/24 0303     Place sequential compression device  Until discontinued         10/15/24 0303                    Discharge Planning   GAMALIEL: 10/18/2024     Code Status: Full Code   Is the patient medically ready for discharge?:     Reason for patient still in hospital (select all that apply): Patient trending condition, Laboratory test, Treatment, and Consult recommendations  Discharge Plan A: Home with family          Critical secondary to Patient has a condition that poses threat to life and bodily function: decompensated hepatic cirrhosis     Critical care was time spent personally by me on the following activities: development of treatment plan with patient or surrogate and bedside caregivers, discussions with consultants, evaluation of patient's response to treatment, examination of patient, ordering and performing treatments and interventions, ordering and review of laboratory studies, ordering and review of radiographic studies, pulse oximetry, re-evaluation of patient's condition. This critical care time did not overlap with that of any other provider or involve time for any procedures.    30 mins of Critical Care time was provided in order to assess and manage the high probability of imminent or life-threatening deterioration of cardio-respiratory status requiring vasodilator support and pending intubation         Ena Stewart MD  Department of Hospital Medicine   [facility] - Telemetry Stepdown

## 2024-10-17 NOTE — SUBJECTIVE & OBJECTIVE
Interval History     Patient doing well. Does have some jitteriness however not scoring on CIWA and appears comfortable.     Review of Systems   Constitutional:  Negative for activity change, chills, diaphoresis (mild sweats) and fever (subjective).   HENT:  Negative for trouble swallowing.    Eyes:  Negative for visual disturbance.        Eye yellowing   Respiratory:  Negative for shortness of breath and wheezing.    Cardiovascular:  Negative for chest pain and leg swelling.   Gastrointestinal:  Negative for abdominal pain, constipation and nausea.   Genitourinary:  Negative for dysuria, frequency, hematuria and urgency.        Dark urine   Musculoskeletal:  Negative for arthralgias and back pain.   Skin:  Positive for color change and rash (psoriasis).   Neurological:  Negative for dizziness and light-headedness.   Psychiatric/Behavioral:  Negative for confusion. The patient is not nervous/anxious.      Objective:     Vital Signs (Most Recent):  Temp: 98.8 °F (37.1 °C) (10/16/24 2000)  Pulse: 92 (10/16/24 2000)  Resp: 18 (10/16/24 2000)  BP: 134/82 (10/16/24 2000)  SpO2: 99 % (10/16/24 2000) Vital Signs (24h Range):  Temp:  [98.4 °F (36.9 °C)-98.8 °F (37.1 °C)] 98.8 °F (37.1 °C)  Pulse:  [] 92  Resp:  [18-20] 18  SpO2:  [95 %-100 %] 99 %  BP: (134-150)/(76-88) 134/82     Weight: 97.8 kg (215 lb 9.8 oz)  Body mass index is 29.24 kg/m².     Physical Exam  Vitals and nursing note reviewed.   Constitutional:       General: He is not in acute distress.     Appearance: He is well-developed. He is obese.      Comments: Alert and oriented x4   HENT:      Head: Normocephalic and atraumatic.   Eyes:      General: Scleral icterus present.   Cardiovascular:      Rate and Rhythm: Regular rhythm. Tachycardia present.      Heart sounds: Normal heart sounds. No murmur heard.  Pulmonary:      Effort: Pulmonary effort is normal. No respiratory distress.      Breath sounds: Normal breath sounds.   Abdominal:      General:  Bowel sounds are normal. There is distension.      Palpations: Abdomen is soft.      Tenderness: There is no abdominal tenderness.      Comments: Non tender abdomen  Singh's negative   Musculoskeletal:         General: No tenderness. Normal range of motion.      Cervical back: Normal range of motion.      Right lower leg: No edema.      Left lower leg: No edema.   Skin:     General: Skin is warm and dry.      Capillary Refill: Capillary refill takes less than 2 seconds.      Coloration: Skin is jaundiced (mild).      Findings: Rash present.   Neurological:      Mental Status: He is alert and oriented to person, place, and time. Mental status is at baseline.      Comments: Mild tremor BUE  No asterixis   Psychiatric:         Mood and Affect: Mood normal.         Behavior: Behavior normal.      Comments: Appears anxious, endorses anxiety                 Significant Labs: All pertinent labs within the past 24 hours have been reviewed.  CBC:   Recent Labs   Lab 10/14/24  2238 10/15/24  0816 10/16/24  0527   WBC  --  10.03 11.21   HGB  --  11.1* 12.2*   HCT 41 30.1* 33.6*   PLT  --  153 176     CMP:   Recent Labs   Lab 10/14/24  2337 10/15/24  0816 10/16/24  0527    137 136   K 3.7 4.0 3.9    102 105   CO2 23 22* 22*   GLU 85 95 99   BUN 6 6 6   CREATININE 0.8 0.7 0.7   CALCIUM 9.0 8.8 8.7   PROT 8.1 7.0 7.4   ALBUMIN 2.8* 2.5* 2.5*   BILITOT 15.8* 14.8* 17.0*   ALKPHOS 240* 200* 218*   * 120* 126*   ALT 27 23 25   ANIONGAP 14 13 9       Significant Imaging: I have reviewed all pertinent imaging results/findings within the past 24 hours.

## 2024-10-17 NOTE — TREATMENT PLAN
Hepatology Treatment Plan    Fermin Moreno is a 34 y.o. male admitted to hospital 10/14/2024 (Hospital Day: 4) due to Decompensated cirrhosis.     Interval History  No acute events overnight. Patient desires discharge.     Objective  Temp:  [98.3 °F (36.8 °C)-98.8 °F (37.1 °C)] 98.8 °F (37.1 °C) (10/17 0811)  Pulse:  [] 100 (10/17 0811)  BP: (134-148)/(72-88) 148/72 (10/17 0811)  Resp:  [18-20] 18 (10/17 0811)  SpO2:  [98 %-100 %] 99 % (10/17 0811)    Laboratory    Lab Results   Component Value Date    WBC 9.31 10/17/2024    HGB 11.5 (L) 10/17/2024    HCT 31.3 (L) 10/17/2024     (H) 10/17/2024     10/17/2024       Lab Results   Component Value Date     10/17/2024    K 3.8 10/17/2024     10/17/2024    CO2 21 (L) 10/17/2024    BUN 7 10/17/2024    CREATININE 0.8 10/17/2024    CALCIUM 8.4 (L) 10/17/2024       Lab Results   Component Value Date    ALBUMIN 2.4 (L) 10/17/2024    ALT 25 10/17/2024     (H) 10/17/2024    GGT 1,846 (H) 10/15/2024    ALKPHOS 196 (H) 10/17/2024    BILITOT 16.7 (H) 10/17/2024       Lab Results   Component Value Date    INR 1.6 (H) 10/17/2024    INR 1.5 (H) 10/16/2024    INR 1.6 (H) 10/15/2024     MELD 3.0: 25 at 10/17/2024  3:41 AM  MELD-Na: 22 at 10/17/2024  3:41 AM  Calculated from:  Serum Creatinine: 0.8 mg/dL (Using min of 1 mg/dL) at 10/17/2024  3:41 AM  Serum Sodium: 137 mmol/L at 10/17/2024  3:41 AM  Total Bilirubin: 16.7 mg/dL at 10/17/2024  3:41 AM  Serum Albumin: 2.4 g/dL at 10/17/2024  3:41 AM  INR(ratio): 1.6 at 10/17/2024  3:41 AM  Age at listing (hypothetical): 34 years  Sex: Male at 10/17/2024  3:41 AM    Assessment  34M with alcohol use disorder, psoriasis, who was admitted on 10/14/24 for evaluation of jaundice. Hepatology is consulted for evaluation. Regarding EtOH use, reports his last drink was on 10/8/24. Reports drinking 12 beers a day for many years. He reports prio DUI in his late teens. Denies current / prior participation in  structured sobriety. Reports having a few tattoos. Denies illicit drug use, blood transfusions. Denies prior diagnosis of DM, HTN, HLD, BELKIS.   On presentation, labs are notable for Na 139, Cr 0.8, , ALT 27, , TB 15.8, INR 1.5. MELD 3.0 24, MDF 33.7. Infectious workup is negative so far. On contrasted CT A/P (10/14/24) the liver has a cirrhotic morphology with evidence of portal hypertension with patent portal vein. Presentation seem most consistent with acute alcohol-related hepatitis with underlying EtOH-related cirrhosis.    Plan  - Unfortunately given lack of insurance, we would be unable to open a transplant evaluation. [place] and even if [place] were to [place] med[place], we still would be unable to open an evaluation at [facility] [place]. Recommend SW consult to determine what hospital/hepatologist he could follow up with as an outpatient.   - His liver enzymes are slighting improving.   - Counseled on alcohol cessation and will need to maintain sobriety with hopes that his liver function will improve.   - Infectious workup unremarkable.   - RODRIGO, ceruloplasmin negative; remainder of workup still in process.     Thank you for involving us in the care of Fermin Moreno. Please call with any additional concerns or questions. We will sign-off.    Ashlie Chandler MD  Gastroenterology Fellow, PGY-V  [facility]

## 2024-10-18 LAB
CLINICAL BIOCHEMIST REVIEW: NORMAL
PLPETH BLD-MCNC: 807 NG/ML
POPETH BLD-MCNC: 1090 NG/ML

## 2024-10-19 NOTE — ASSESSMENT & PLAN NOTE
Extensive psoriasis and has not been able to follow up outpatient due to lack of insurance however he has been using topical steroids at home.     discussed with dermatology, given no acuity and no signs of acute infection recommendation for outpatient follow up. Recommended topical steroid for psoriasis is triamcinolone 0.1% ointment for the body and hydrocortisone 2.5% ointment for the axillae/groin.    SW gave resources/info for patient to followup at [facility] derm

## 2024-10-19 NOTE — ASSESSMENT & PLAN NOTE
Alcoholic liver cirrhosis    Patient with known Cirrhosis with Child's class B. Co-morbidities are present and inclusive of ascites, portal hypertension, esophageal varices, anemia/pancytopenia, and anticoagulation.  MELD-Na score calculated; MELD 3.0: 25 at 10/17/2024  3:41 AM  MELD-Na: 22 at 10/17/2024  3:41 AM  Calculated from:  Serum Creatinine: 0.8 mg/dL (Using min of 1 mg/dL) at 10/17/2024  3:41 AM  Serum Sodium: 137 mmol/L at 10/17/2024  3:41 AM  Total Bilirubin: 16.7 mg/dL at 10/17/2024  3:41 AM  Serum Albumin: 2.4 g/dL at 10/17/2024  3:41 AM  INR(ratio): 1.6 at 10/17/2024  3:41 AM  Age at listing (hypothetical): 34 years  Sex: Male at 10/17/2024  3:41 AM      Continue chronic meds. Etiology likely ETOH. Will avoid any hepatotoxic meds, and monitor CBC/CMP/INR for synthetic function.     Patient presents with hyperbilirubinemia, bilirubin in urine, and jaundice.   8 years heavy alcohol abuse   - tachy to 133 on arrival, improved with 1L LR   - CT abd pelvis with evidence of cirrhosis, mild ascites, portal HTN, varices   - Alcohol <10 (last drink 5 days ago)    - neg hep c and HIV, further serological workup obtained   - Appreciate hepatology recommendations.  - Defer steroids for now to ensure no infection.  - No safe pocket for paracentesis, Rocephin for SBP ppx- transition to [facility] at [place]  to complete course  - MELD score of 25, will monitor for improvement for transition to outpatient followup vs inpatient evaluation.   - Will consult CM with assistance due to him being uninsured.   Per hepatology: Unfortunately given lack of insurance, we would be unable to open a transplant evaluation. [place] and even if [place] were [place], we still would be unable to open an evaluation at [facility] [place]. Recommend  consult to determine what hospital/hepatologist he could follow up with as an outpatient.   - His liver enzymes are slighting improving.   - Counseled on alcohol cessation and will need to  maintain sobriety with hopes that his liver function will improve.   - Infectious workup unremarkable.   Referred to [facility] to followup with derm and hepatology. Stable for discharge

## 2024-10-19 NOTE — DISCHARGE SUMMARY
"[facility] - Telemetry [facility] Medicine  Discharge Summary      Patient Name: Fermin Moreno  MRN: [id number]  EFFIE: [id number]  Patient Class: IP- Inpatient  Admission Date: 10/14/2024  Hospital Length of Stay: 2 days  Discharge Date and Time: 10/17/2024  2:59 PM  Attending Physician: Karen att. providers found   Discharging Provider: Lavonne Lemos MD  Primary Care Provider: Karen, Primary Doctor  [facility] Medicine Team: [facility] Lavonne Lemos MD  Primary Care Team: [facility]    HPI:   Fermin Moreno is a 34 male PMH of Psoriasis, alcohol abuse being admitted to  for hyperbilirubinemia and jaundice. Patient initially presented to ED with CC dark urine and mild dysuria x 1.5 weeks. Associated chills, sweats, tremors, anxiety, nausea, and yellowing of this skin/ eyes. Patient endorses about 8 years of heavy drinking. First, 3-4 years drinking ~1 pint vodka daily followed by 4 years of drinking ~12 beers daily. Last drink was 5 days ago. Denies documented fevers, confusion, hallucinations, LOC, seizures, lightheadedness, lethargy, abdominal swelling, leg swelling, SOB, chest pain, bowel changes, vomiting, flank pain, bleeding. Endorses mild intermittent LLQ pain, resolved at time of admission. Patient denies known hx of liver disease, heart disease, kidney disease. He does not follow with a PCP and has never seen a hepatologist.     In the ED: , /101, improved after 1 L LR bolus. Afebrile. WBC 11.7K. Hgb 13.7/ Hct 37.3, MCV 99. PT 15.9/ INR 1.5. . Albumin 2.8. T bili 15.8. Bili direct 11.3. / ALT 27. Alcohol <10. UA with 1+ protein, 1+ ketones, 1+ glucose, 3+ bilirubin; non-infectious. CT abd pelvis w contrast: "Morphologic changes of chronic liver disease with diffuse heterogeneous attenuation of the liver parenchyma suggestive of underlying liver dysfunction.  Associated stigmata of portal hypertension including splenomegaly, upper abdominal and gastroesophageal varices, and " "small volume ascites.  Suggest correlation with risk factors and LFTs.  Multiphase MRI follow-up for hepatocellular carcinoma screening could be considered if clinically appropriate. Gallbladder distension with mild inflammatory change and pericholecystic free fluid.  Findings could be seen in the setting of liver dysfunction though suggest correlation for any clinical signs of cholecystitis."     * No surgery found *      Hospital Course:   Patient admitted for decompensated cirrhosis.  Evaluated by hepatology appreciate recommendations.  Chronic liver disease serological labs were sent. Paracentesis attempted however no pocket for safe paracentesis.  Ammonia of greater than 100 however mentation intact.  Monitor MELD. Started on Rocephin for SBP ppx. Holding steroids for now to ensure no infection. Will consult CM to help him set up insurance with [employer]/medicaid     Addiction psychiatry evaluated the patient.  Appreciate recommendations.     Regarding extensive psoriasis, discussed with dermatology, given no acuity and no signs of acute infection recommendation for outpatient follow up. Recommended topical steroid for psoriasis is triamcinolone 0.1% ointment for the body and hydrocortisone 2.5% ointment for the axillae/groin.     Reports last drink 7 days ago. Not scoring on CIWA.  Per hepatology:  Unfortunately given lack of insurance, we would be unable to open a transplant evaluation. [place] and [place] if [place] were [place] still [place] [facility] [place]  consult to determine what hospital/hepatologist he could follow up with as an outpatient.   - His liver enzymes are slighting improving.   - Counseled on alcohol cessation and will need to maintain sobriety with hopes that his liver function will improve.   - Infectious workup unremarkable.   Referred to [facility] to followup with derm and hepatology. Stable for discharge.       Goals of Care " Treatment Preferences:  Code Status: Full Code      SDOH Screening:  The patient was screened for utility difficulties, food insecurity, transport difficulties, housing insecurity, and interpersonal safety and there were no concerns identified this admission.     Consults:   Consults (From admission, onward)          Status Ordering Provider     Inpatient consult to Hospital Medicine-General  Once        Provider:  (Not yet assigned)    Completed KARL JOSEPH     Inpatient consult to Psychiatry  Once        Provider:  (Not yet assigned)    Completed CARLOS LORA     Inpatient consult to Hepatology  Once        Provider:  (Not yet assigned)    Completed CARLOS LORA            Psychiatric  Alcohol use disorder  - PETH obtained   - start thiamine, MV, folate   - CIWA monitoring; prn valium for CIWA >8  - addiction psych saw patient. Appreciate recommendations.       Derm  Psoriasis  Extensive psoriasis and has not been able to follow up outpatient due to lack of insurance however he has been using topical steroids at home.     discussed with dermatology, given no acuity and no signs of acute infection recommendation for outpatient follow up. Recommended topical steroid for psoriasis is triamcinolone 0.1% ointment for the body and hydrocortisone 2.5% ointment for the axillae/groin.    SW gave resources/info for patient to followup at [facility] derm    Cardiac/Vascular  Portal vein obstruction  US Liver with doppler reported Cirrhotic morphology with sequela of portal hypertension including small volume ascites, splenomegaly, patent paraumbilical vein, collateral vessels, and reversed flow in the splenic vein, right and left portal veins.     Partially occlusive thrombus versus sluggish flow in the superior mesenteric, splenic, main portal, and right left portal veins.     CT abdomen however suggested patent vasculature    No intervention necessary at this time.    Tachycardia  Likely 2/2 anxiety and decreased PO  "intake .   Improved after IV bolus on admission  CIWA has been < 6         Hematology  Coagulopathy  2/2/ cirrhosis   No results for input(s): "PT", "INR", "APTT" in the last 24 hours.          Oncology  Hypoalbuminemia  2/2 decompensated cirrhosis         GI  * Decompensated cirrhosis  Alcoholic liver cirrhosis    Patient with known Cirrhosis with Child's class B. Co-morbidities are present and inclusive of ascites, portal hypertension, esophageal varices, anemia/pancytopenia, and anticoagulation.  MELD-Na score calculated; MELD 3.0: 25 at 10/17/2024  3:41 AM  MELD-Na: 22 at 10/17/2024  3:41 AM  Calculated from:  Serum Creatinine: 0.8 mg/dL (Using min of 1 mg/dL) at 10/17/2024  3:41 AM  Serum Sodium: 137 mmol/L at 10/17/2024  3:41 AM  Total Bilirubin: 16.7 mg/dL at 10/17/2024  3:41 AM  Serum Albumin: 2.4 g/dL at 10/17/2024  3:41 AM  INR(ratio): 1.6 at 10/17/2024  3:41 AM  Age at listing (hypothetical): 34 years  Sex: Male at 10/17/2024  3:41 AM      Continue chronic meds. Etiology likely ETOH. Will avoid any hepatotoxic meds, and monitor CBC/CMP/INR for synthetic function.     Patient presents with hyperbilirubinemia, bilirubin in urine, and jaundice.   8 years heavy alcohol abuse   - tachy to 133 on arrival, improved with 1L LR   - CT abd pelvis with evidence of cirrhosis, mild ascites, portal HTN, varices   - Alcohol <10 (last drink 5 days ago)    - neg hep c and HIV, further serological workup obtained   - Appreciate hepatology recommendations.  - Defer steroids for now to ensure no infection.  - No safe pocket for paracentesis, Rocephin for SBP ppx- [facility] [place]  [facility]  - MELD score of 25, will monitor for improvement for transition to outpatient followup vs inpatient evaluation.   - Will consult CM with assistance due to him being uninsured.   Per hepatology: Unfortunately given lack of insurance, we would be unable to open a transplant evaluation. [place] and even if [place] " were [place], we still would be unable to open an evaluation at [facility] [place]. Recommend  consult to determine what hospital/hepatologist he could follow up with as an outpatient.   - His liver enzymes are slighting improving.   - Counseled on alcohol cessation and will need to maintain sobriety with hopes that his liver function will improve.   - Infectious workup unremarkable.   Referred to [facility] to followup with derm and hepatology. 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  Plan as decompensated hepatic cirrhosis           Final Active Diagnoses:    Diagnosis Date Noted POA    PRINCIPAL PROBLEM:  Decompensated cirrhosis [K72.90, K74.60] 10/15/2024 Yes    Alcohol use disorder [F10.90] 10/16/2024 Yes    Psoriasis [L40.9] 10/16/2024 Yes    Hyperbilirubinemia [E80.6] 10/16/2024 Yes    Hypoalbuminemia [E88.09] 10/16/2024 Yes    Portal vein obstruction [I81] 10/16/2024 Yes    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    Why: Established pt's hospital f/u visit @ 9:45am. Please bring discharge summary, ID, insurance card, and medication list.  Contact information:  [address] [place] [address] [place] [address] [phone]                         Patient Instructions:      Ambulatory referral/consult to Smoking Cessation Program   Standing Status: Future   Referral Priority: Routine Referral Type: Consultation   Referral Reason: Specialty Services Required   Requested Specialty: CTTS    Number of Visits Requested: 1     Diet Adult Regular     Order Specific Question Answer Comments   Na restriction, if any: 2gNa    Fluid restriction: Fluid - 1800mL      Notify your health care provider if you experience any of the following:  temperature >100.4     Notify your health care provider if you experience any of the following:  persistent nausea and vomiting or diarrhea     Notify your health care provider if you experience any of the following:  severe uncontrolled pain     Notify your health care provider if you experience any of the following:  redness, tenderness, or signs of infection (pain, swelling, redness, odor or green/yellow discharge around incision site)     Notify your health care provider if you experience any of the following:  difficulty breathing or increased cough     Notify your health care provider if you experience any of the following:  severe persistent headache     Notify your health care provider if you experience any of the following:  worsening rash     Notify your health care provider if you experience any of the following:  persistent dizziness, light-headedness, or visual disturbances     Notify your health care provider if you experience any of the following:  increased confusion or weakness     Activity as tolerated       Significant Diagnostic Studies: Labs: All labs within the past 24 hours have been reviewed    Pending Diagnostic Studies:       Procedure Component Value Units Date/Time    Alpha 1 Antitrypsin Phenotype [[id number]] Collected: 10/15/24 0817    Order Status: Sent Lab Status: In process Updated: 10/15/24 0933    Specimen: Blood            Medications:  Reconciled Home Medications:      Medication List        START taking these medications      ciprofloxacin HCl 500 MG tablet  Commonly known as: CIPRO  Take 1 tablet (500 mg total) by mouth every 12 (twelve) hours. for 2 days     folic acid 1 MG tablet  Commonly known as: FOLVITE  Take 1 tablet (1 mg total)  by mouth once daily.     hydrocortisone 2.5 % cream  Apply topically 2 (two) times daily. Apply to groin and armpits.     multivitamin Tab  Take 1 tablet by mouth once daily.     nicotine 14 mg/24 hr  Commonly known as: NICODERM CQ  Place 1 patch onto the skin once daily.     thiamine 100 MG tablet  Take 1 tablet (100 mg total) by mouth once daily.     triamcinolone acetonide 0.1% 0.1 % ointment  Commonly known as: KENALOG  Apply topically 2 (two) times daily. Apply to rash on body. Avoid armpits and groin.              Indwelling Lines/Drains at time of discharge:   Lines/Drains/Airways       None                   Time spent on the discharge of patient: 45 minutes of time spent on discharge, including examining the patient, providing discharge instructions, arranging followup and documentation.            Lavonne Lemos MD  Department of Hospital Medicine  [facility] - Telemetry Stepdown

## 2024-10-20 LAB
BACTERIA BLD CULT: NORMAL
BACTERIA BLD CULT: NORMAL

## 2024-10-21 LAB
A1AT PHENOTYP SERPL-IMP: ABNORMAL BANDS
A1AT SERPL NEPH-MCNC: 228 MG/DL (ref 100–190)

## 2024-10-28 ENCOUNTER — LAB VISIT (OUTPATIENT)
Dept: LAB | Facility: HOSPITAL | Age: 34
End: 2024-10-28

## 2024-10-28 ENCOUNTER — OFFICE VISIT (OUTPATIENT)
Dept: INTERNAL MEDICINE | Facility: CLINIC | Age: 34
End: 2024-10-28

## 2024-10-28 VITALS
BODY MASS INDEX: 29.15 KG/M2 | HEIGHT: 72 IN | SYSTOLIC BLOOD PRESSURE: 138 MMHG | WEIGHT: 215.19 LBS | OXYGEN SATURATION: 100 % | HEART RATE: 82 BPM | DIASTOLIC BLOOD PRESSURE: 82 MMHG

## 2024-10-28 DIAGNOSIS — K72.90 DECOMPENSATED CIRRHOSIS: ICD-10-CM

## 2024-10-28 DIAGNOSIS — Z09 HOSPITAL DISCHARGE FOLLOW-UP: ICD-10-CM

## 2024-10-28 DIAGNOSIS — K74.60 DECOMPENSATED CIRRHOSIS: Primary | ICD-10-CM

## 2024-10-28 DIAGNOSIS — Z59.71 INSURANCE COVERAGE PROBLEMS: ICD-10-CM

## 2024-10-28 DIAGNOSIS — K72.90 DECOMPENSATED CIRRHOSIS: Primary | ICD-10-CM

## 2024-10-28 DIAGNOSIS — K74.60 DECOMPENSATED CIRRHOSIS: ICD-10-CM

## 2024-10-28 DIAGNOSIS — L40.9 PSORIASIS: ICD-10-CM

## 2024-10-28 DIAGNOSIS — F10.90 ALCOHOL USE DISORDER: ICD-10-CM

## 2024-10-28 LAB
ALBUMIN SERPL BCP-MCNC: 2.4 G/DL (ref 3.5–5.2)
ALP SERPL-CCNC: 181 U/L (ref 40–150)
ALT SERPL W/O P-5'-P-CCNC: 35 U/L (ref 10–44)
ANION GAP SERPL CALC-SCNC: 10 MMOL/L (ref 8–16)
AST SERPL-CCNC: 147 U/L (ref 10–40)
BILIRUB SERPL-MCNC: 23.3 MG/DL (ref 0.1–1)
BUN SERPL-MCNC: 10 MG/DL (ref 6–20)
CALCIUM SERPL-MCNC: 8.9 MG/DL (ref 8.7–10.5)
CHLORIDE SERPL-SCNC: 105 MMOL/L (ref 95–110)
CO2 SERPL-SCNC: 21 MMOL/L (ref 23–29)
CREAT SERPL-MCNC: 0.8 MG/DL (ref 0.5–1.4)
EST. GFR  (NO RACE VARIABLE): >60 ML/MIN/1.73 M^2
GLUCOSE SERPL-MCNC: 95 MG/DL (ref 70–110)
POTASSIUM SERPL-SCNC: 4.2 MMOL/L (ref 3.5–5.1)
PROT SERPL-MCNC: 7.9 G/DL (ref 6–8.4)
SODIUM SERPL-SCNC: 136 MMOL/L (ref 136–145)

## 2024-10-28 PROCEDURE — 99214 OFFICE O/P EST MOD 30 MIN: CPT | Mod: S$PBB,,, | Performed by: STUDENT IN AN ORGANIZED HEALTH CARE EDUCATION/TRAINING PROGRAM

## 2024-10-28 PROCEDURE — 99215 OFFICE O/P EST HI 40 MIN: CPT | Mod: PBBFAC | Performed by: STUDENT IN AN ORGANIZED HEALTH CARE EDUCATION/TRAINING PROGRAM

## 2024-10-28 PROCEDURE — 99999 PR PBB SHADOW E&M-EST. PATIENT-LVL V: CPT | Mod: PBBFAC,,, | Performed by: STUDENT IN AN ORGANIZED HEALTH CARE EDUCATION/TRAINING PROGRAM

## 2024-10-28 PROCEDURE — 36415 COLL VENOUS BLD VENIPUNCTURE: CPT | Performed by: STUDENT IN AN ORGANIZED HEALTH CARE EDUCATION/TRAINING PROGRAM

## 2024-10-28 PROCEDURE — 80053 COMPREHEN METABOLIC PANEL: CPT | Performed by: STUDENT IN AN ORGANIZED HEALTH CARE EDUCATION/TRAINING PROGRAM

## 2024-10-29 ENCOUNTER — PATIENT OUTREACH (OUTPATIENT)
Dept: ADMINISTRATIVE | Facility: OTHER | Age: 34
End: 2024-10-29